# Patient Record
Sex: FEMALE | Race: ASIAN | NOT HISPANIC OR LATINO | URBAN - METROPOLITAN AREA
[De-identification: names, ages, dates, MRNs, and addresses within clinical notes are randomized per-mention and may not be internally consistent; named-entity substitution may affect disease eponyms.]

---

## 2017-02-18 ENCOUNTER — INPATIENT (INPATIENT)
Facility: HOSPITAL | Age: 82
LOS: 16 days | Discharge: NURSING, REHAB OR LONG-TERM CARE | DRG: 177 | End: 2017-03-07
Attending: INTERNAL MEDICINE | Admitting: INTERNAL MEDICINE
Payer: MEDICARE

## 2017-02-18 VITALS
OXYGEN SATURATION: 94 % | RESPIRATION RATE: 22 BRPM | HEART RATE: 89 BPM | SYSTOLIC BLOOD PRESSURE: 148 MMHG | WEIGHT: 119.93 LBS | DIASTOLIC BLOOD PRESSURE: 78 MMHG | TEMPERATURE: 99 F

## 2017-02-18 DIAGNOSIS — J44.9 CHRONIC OBSTRUCTIVE PULMONARY DISEASE, UNSPECIFIED: ICD-10-CM

## 2017-02-18 LAB
ALBUMIN SERPL ELPH-MCNC: 3.4 G/DL — LOW (ref 3.5–5)
ALP SERPL-CCNC: 102 U/L — SIGNIFICANT CHANGE UP (ref 40–120)
ALT FLD-CCNC: 27 U/L DA — SIGNIFICANT CHANGE UP (ref 10–60)
ANION GAP SERPL CALC-SCNC: 10 MMOL/L — SIGNIFICANT CHANGE UP (ref 5–17)
AST SERPL-CCNC: 20 U/L — SIGNIFICANT CHANGE UP (ref 10–40)
BASE EXCESS BLDA CALC-SCNC: 3.3 MMOL/L — HIGH (ref -2–2)
BASOPHILS # BLD AUTO: 0.1 K/UL — SIGNIFICANT CHANGE UP (ref 0–0.2)
BASOPHILS NFR BLD AUTO: 1.4 % — SIGNIFICANT CHANGE UP (ref 0–2)
BILIRUB SERPL-MCNC: 0.3 MG/DL — SIGNIFICANT CHANGE UP (ref 0.2–1.2)
BLOOD GAS COMMENTS ARTERIAL: SIGNIFICANT CHANGE UP
BUN SERPL-MCNC: 18 MG/DL — SIGNIFICANT CHANGE UP (ref 7–18)
CALCIUM SERPL-MCNC: 9.2 MG/DL — SIGNIFICANT CHANGE UP (ref 8.4–10.5)
CHLORIDE SERPL-SCNC: 97 MMOL/L — SIGNIFICANT CHANGE UP (ref 96–108)
CK SERPL-CCNC: 132 U/L — SIGNIFICANT CHANGE UP (ref 21–215)
CO2 SERPL-SCNC: 28 MMOL/L — SIGNIFICANT CHANGE UP (ref 22–31)
CREAT SERPL-MCNC: 0.97 MG/DL — SIGNIFICANT CHANGE UP (ref 0.5–1.3)
EOSINOPHIL # BLD AUTO: 0.5 K/UL — SIGNIFICANT CHANGE UP (ref 0–0.5)
EOSINOPHIL NFR BLD AUTO: 5.6 % — SIGNIFICANT CHANGE UP (ref 0–6)
GLUCOSE SERPL-MCNC: 149 MG/DL — HIGH (ref 70–99)
HCO3 BLDA-SCNC: 28 MMOL/L — HIGH (ref 23–27)
HCT VFR BLD CALC: 28.6 % — LOW (ref 34.5–45)
HGB BLD-MCNC: 9.3 G/DL — LOW (ref 11.5–15.5)
HOROWITZ INDEX BLDA+IHG-RTO: 28 — SIGNIFICANT CHANGE UP
LYMPHOCYTES # BLD AUTO: 2.4 K/UL — SIGNIFICANT CHANGE UP (ref 1–3.3)
LYMPHOCYTES # BLD AUTO: 27.8 % — SIGNIFICANT CHANGE UP (ref 13–44)
MAGNESIUM SERPL-MCNC: 2.1 MG/DL — SIGNIFICANT CHANGE UP (ref 1.8–2.4)
MCHC RBC-ENTMCNC: 27.2 PG — SIGNIFICANT CHANGE UP (ref 27–34)
MCHC RBC-ENTMCNC: 32.4 GM/DL — SIGNIFICANT CHANGE UP (ref 32–36)
MCV RBC AUTO: 83.7 FL — SIGNIFICANT CHANGE UP (ref 80–100)
MONOCYTES # BLD AUTO: 0.9 K/UL — SIGNIFICANT CHANGE UP (ref 0–0.9)
MONOCYTES NFR BLD AUTO: 10.4 % — SIGNIFICANT CHANGE UP (ref 2–14)
NEUTROPHILS # BLD AUTO: 4.7 K/UL — SIGNIFICANT CHANGE UP (ref 1.8–7.4)
NEUTROPHILS NFR BLD AUTO: 55 % — SIGNIFICANT CHANGE UP (ref 43–77)
OB PNL STL: NEGATIVE — SIGNIFICANT CHANGE UP
PCO2 BLDA: 42 MMHG — SIGNIFICANT CHANGE UP (ref 32–46)
PH BLDA: 7.43 — SIGNIFICANT CHANGE UP (ref 7.35–7.45)
PLATELET # BLD AUTO: 201 K/UL — SIGNIFICANT CHANGE UP (ref 150–400)
PO2 BLDA: 72 MMHG — LOW (ref 74–108)
POTASSIUM SERPL-MCNC: 4.6 MMOL/L — SIGNIFICANT CHANGE UP (ref 3.5–5.3)
POTASSIUM SERPL-SCNC: 4.6 MMOL/L — SIGNIFICANT CHANGE UP (ref 3.5–5.3)
PROT SERPL-MCNC: 7.9 G/DL — SIGNIFICANT CHANGE UP (ref 6–8.3)
RBC # BLD: 3.41 M/UL — LOW (ref 3.8–5.2)
RBC # FLD: 13.3 % — SIGNIFICANT CHANGE UP (ref 10.3–14.5)
SAO2 % BLDA: 95 % — SIGNIFICANT CHANGE UP (ref 92–96)
SODIUM SERPL-SCNC: 135 MMOL/L — SIGNIFICANT CHANGE UP (ref 135–145)
TROPONIN I SERPL-MCNC: <0.015 NG/ML — SIGNIFICANT CHANGE UP (ref 0–0.04)
WBC # BLD: 8.5 K/UL — SIGNIFICANT CHANGE UP (ref 3.8–10.5)
WBC # FLD AUTO: 8.5 K/UL — SIGNIFICANT CHANGE UP (ref 3.8–10.5)

## 2017-02-18 PROCEDURE — 71010: CPT | Mod: 26

## 2017-02-18 PROCEDURE — 99284 EMERGENCY DEPT VISIT MOD MDM: CPT | Mod: 25

## 2017-02-18 RX ORDER — ALBUTEROL 90 UG/1
2.5 AEROSOL, METERED ORAL ONCE
Qty: 0 | Refills: 0 | Status: COMPLETED | OUTPATIENT
Start: 2017-02-18 | End: 2017-02-18

## 2017-02-18 RX ADMIN — ALBUTEROL 2.5 MILLIGRAM(S): 90 AEROSOL, METERED ORAL at 22:41

## 2017-02-18 RX ADMIN — Medication 125 MILLIGRAM(S): at 22:41

## 2017-02-18 NOTE — ED ADULT NURSE NOTE - OBJECTIVE STATEMENT
Pt. bib EMS in respiratory distress. Pt. is awake but refuses to speak. medicated as per order. Pt. on cardiac monitor and connected to wall oxygen of 2 liters. Family at bedside.

## 2017-02-18 NOTE — ED PROVIDER NOTE - CARE PLAN
Principal Discharge DX:	Dyspnea, unspecified type Principal Discharge DX:	Chronic obstructive pulmonary disease, unspecified COPD type

## 2017-02-18 NOTE — ED PROVIDER NOTE - MEDICAL DECISION MAKING DETAILS
89 y/o f DNR, copd, htn, dementia w difficulty breathing. nebs, steroids, magnesium sulfate, labs, cxr. Likely for admission.

## 2017-02-18 NOTE — ED PROVIDER NOTE - OBJECTIVE STATEMENT
89 y/o f h/o copd, dementia, anxiety, htn, hld, presenting from NH for difficulty breathing. Pt unable to contribute to history secondary to dementia.

## 2017-02-18 NOTE — ED ADULT NURSE NOTE - PMH
Alzheimer's dementia    Chronic ischemic heart disease    COPD (chronic obstructive pulmonary disease)    GERD (gastroesophageal reflux disease)    HLD (hyperlipidemia)    HTN (hypertension)

## 2017-02-19 DIAGNOSIS — J96.01 ACUTE RESPIRATORY FAILURE WITH HYPOXIA: ICD-10-CM

## 2017-02-19 DIAGNOSIS — I50.9 HEART FAILURE, UNSPECIFIED: ICD-10-CM

## 2017-02-19 DIAGNOSIS — Z41.8 ENCOUNTER FOR OTHER PROCEDURES FOR PURPOSES OTHER THAN REMEDYING HEALTH STATE: ICD-10-CM

## 2017-02-19 DIAGNOSIS — D64.9 ANEMIA, UNSPECIFIED: ICD-10-CM

## 2017-02-19 DIAGNOSIS — I10 ESSENTIAL (PRIMARY) HYPERTENSION: ICD-10-CM

## 2017-02-19 LAB
ALBUMIN SERPL ELPH-MCNC: 3.4 G/DL — LOW (ref 3.5–5)
ALP SERPL-CCNC: 101 U/L — SIGNIFICANT CHANGE UP (ref 40–120)
ALT FLD-CCNC: 27 U/L DA — SIGNIFICANT CHANGE UP (ref 10–60)
ANION GAP SERPL CALC-SCNC: 9 MMOL/L — SIGNIFICANT CHANGE UP (ref 5–17)
APPEARANCE UR: CLEAR — SIGNIFICANT CHANGE UP
AST SERPL-CCNC: 22 U/L — SIGNIFICANT CHANGE UP (ref 10–40)
BASOPHILS # BLD AUTO: 0 K/UL — SIGNIFICANT CHANGE UP (ref 0–0.2)
BASOPHILS # BLD AUTO: 0 K/UL — SIGNIFICANT CHANGE UP (ref 0–0.2)
BASOPHILS # BLD AUTO: 0.1 K/UL — SIGNIFICANT CHANGE UP (ref 0–0.2)
BASOPHILS NFR BLD AUTO: 0.3 % — SIGNIFICANT CHANGE UP (ref 0–2)
BASOPHILS NFR BLD AUTO: 0.7 % — SIGNIFICANT CHANGE UP (ref 0–2)
BASOPHILS NFR BLD AUTO: 1.3 % — SIGNIFICANT CHANGE UP (ref 0–2)
BILIRUB SERPL-MCNC: 0.3 MG/DL — SIGNIFICANT CHANGE UP (ref 0.2–1.2)
BILIRUB UR-MCNC: NEGATIVE — SIGNIFICANT CHANGE UP
BUN SERPL-MCNC: 18 MG/DL — SIGNIFICANT CHANGE UP (ref 7–18)
CALCIUM SERPL-MCNC: 9.2 MG/DL — SIGNIFICANT CHANGE UP (ref 8.4–10.5)
CHLORIDE SERPL-SCNC: 97 MMOL/L — SIGNIFICANT CHANGE UP (ref 96–108)
CHOLEST SERPL-MCNC: 182 MG/DL — SIGNIFICANT CHANGE UP (ref 10–199)
CO2 SERPL-SCNC: 29 MMOL/L — SIGNIFICANT CHANGE UP (ref 22–31)
COLOR SPEC: YELLOW — SIGNIFICANT CHANGE UP
CREAT SERPL-MCNC: 1.08 MG/DL — SIGNIFICANT CHANGE UP (ref 0.5–1.3)
DIFF PNL FLD: ABNORMAL
EOSINOPHIL # BLD AUTO: 0 K/UL — SIGNIFICANT CHANGE UP (ref 0–0.5)
EOSINOPHIL # BLD AUTO: 0 K/UL — SIGNIFICANT CHANGE UP (ref 0–0.5)
EOSINOPHIL # BLD AUTO: 0.3 K/UL — SIGNIFICANT CHANGE UP (ref 0–0.5)
EOSINOPHIL NFR BLD AUTO: 0.1 % — SIGNIFICANT CHANGE UP (ref 0–6)
EOSINOPHIL NFR BLD AUTO: 0.2 % — SIGNIFICANT CHANGE UP (ref 0–6)
EOSINOPHIL NFR BLD AUTO: 2.9 % — SIGNIFICANT CHANGE UP (ref 0–6)
FERRITIN SERPL-MCNC: 74.2 NG/ML — SIGNIFICANT CHANGE UP (ref 15–150)
GLUCOSE SERPL-MCNC: 177 MG/DL — HIGH (ref 70–99)
GLUCOSE UR QL: NEGATIVE — SIGNIFICANT CHANGE UP
HBA1C BLD-MCNC: 6.7 % — HIGH (ref 4–5.6)
HCT VFR BLD CALC: 29.5 % — LOW (ref 34.5–45)
HCT VFR BLD CALC: 39.5 % — SIGNIFICANT CHANGE UP (ref 34.5–45)
HCT VFR BLD CALC: 42 % — SIGNIFICANT CHANGE UP (ref 34.5–45)
HDLC SERPL-MCNC: 74 MG/DL — SIGNIFICANT CHANGE UP (ref 40–125)
HGB BLD-MCNC: 12.8 G/DL — SIGNIFICANT CHANGE UP (ref 11.5–15.5)
HGB BLD-MCNC: 13.5 G/DL — SIGNIFICANT CHANGE UP (ref 11.5–15.5)
HGB BLD-MCNC: 9.6 G/DL — LOW (ref 11.5–15.5)
IRON SATN MFR SERPL: 19 UG/DL — LOW (ref 40–150)
IRON SATN MFR SERPL: 9 % — LOW (ref 15–50)
KETONES UR-MCNC: NEGATIVE — SIGNIFICANT CHANGE UP
LACTATE SERPL-SCNC: 1.8 MMOL/L — SIGNIFICANT CHANGE UP (ref 0.7–2)
LACTATE SERPL-SCNC: 3 MMOL/L — HIGH (ref 0.7–2)
LACTATE SERPL-SCNC: 3.1 MMOL/L — HIGH (ref 0.7–2)
LEUKOCYTE ESTERASE UR-ACNC: NEGATIVE — SIGNIFICANT CHANGE UP
LIPID PNL WITH DIRECT LDL SERPL: 81 MG/DL — SIGNIFICANT CHANGE UP
LYMPHOCYTES # BLD AUTO: 0.6 K/UL — LOW (ref 1–3.3)
LYMPHOCYTES # BLD AUTO: 0.7 K/UL — LOW (ref 1–3.3)
LYMPHOCYTES # BLD AUTO: 1.6 K/UL — SIGNIFICANT CHANGE UP (ref 1–3.3)
LYMPHOCYTES # BLD AUTO: 16.5 % — SIGNIFICANT CHANGE UP (ref 13–44)
LYMPHOCYTES # BLD AUTO: 8.5 % — LOW (ref 13–44)
LYMPHOCYTES # BLD AUTO: 9 % — LOW (ref 13–44)
MAGNESIUM SERPL-MCNC: 2 MG/DL — SIGNIFICANT CHANGE UP (ref 1.8–2.4)
MCHC RBC-ENTMCNC: 26.7 PG — LOW (ref 27–34)
MCHC RBC-ENTMCNC: 27 PG — SIGNIFICANT CHANGE UP (ref 27–34)
MCHC RBC-ENTMCNC: 27 PG — SIGNIFICANT CHANGE UP (ref 27–34)
MCHC RBC-ENTMCNC: 32.1 GM/DL — SIGNIFICANT CHANGE UP (ref 32–36)
MCHC RBC-ENTMCNC: 32.3 GM/DL — SIGNIFICANT CHANGE UP (ref 32–36)
MCHC RBC-ENTMCNC: 32.4 GM/DL — SIGNIFICANT CHANGE UP (ref 32–36)
MCV RBC AUTO: 83 FL — SIGNIFICANT CHANGE UP (ref 80–100)
MCV RBC AUTO: 83.1 FL — SIGNIFICANT CHANGE UP (ref 80–100)
MCV RBC AUTO: 83.4 FL — SIGNIFICANT CHANGE UP (ref 80–100)
MONOCYTES # BLD AUTO: 0.1 K/UL — SIGNIFICANT CHANGE UP (ref 0–0.9)
MONOCYTES # BLD AUTO: 0.1 K/UL — SIGNIFICANT CHANGE UP (ref 0–0.9)
MONOCYTES # BLD AUTO: 0.4 K/UL — SIGNIFICANT CHANGE UP (ref 0–0.9)
MONOCYTES NFR BLD AUTO: 0.9 % — LOW (ref 2–14)
MONOCYTES NFR BLD AUTO: 1.2 % — LOW (ref 2–14)
MONOCYTES NFR BLD AUTO: 4.2 % — SIGNIFICANT CHANGE UP (ref 2–14)
NEUTROPHILS # BLD AUTO: 6.4 K/UL — SIGNIFICANT CHANGE UP (ref 1.8–7.4)
NEUTROPHILS # BLD AUTO: 7.3 K/UL — SIGNIFICANT CHANGE UP (ref 1.8–7.4)
NEUTROPHILS # BLD AUTO: 7.4 K/UL — SIGNIFICANT CHANGE UP (ref 1.8–7.4)
NEUTROPHILS NFR BLD AUTO: 75.2 % — SIGNIFICANT CHANGE UP (ref 43–77)
NEUTROPHILS NFR BLD AUTO: 89.4 % — HIGH (ref 43–77)
NEUTROPHILS NFR BLD AUTO: 89.8 % — HIGH (ref 43–77)
NITRITE UR-MCNC: NEGATIVE — SIGNIFICANT CHANGE UP
PH UR: 6 — SIGNIFICANT CHANGE UP (ref 4.8–8)
PHOSPHATE SERPL-MCNC: 2.6 MG/DL — SIGNIFICANT CHANGE UP (ref 2.5–4.5)
PLATELET # BLD AUTO: 188 K/UL — SIGNIFICANT CHANGE UP (ref 150–400)
PLATELET # BLD AUTO: 244 K/UL — SIGNIFICANT CHANGE UP (ref 150–400)
PLATELET # BLD AUTO: 261 K/UL — SIGNIFICANT CHANGE UP (ref 150–400)
POTASSIUM SERPL-MCNC: 4.8 MMOL/L — SIGNIFICANT CHANGE UP (ref 3.5–5.3)
POTASSIUM SERPL-SCNC: 4.8 MMOL/L — SIGNIFICANT CHANGE UP (ref 3.5–5.3)
PROT SERPL-MCNC: 8.2 G/DL — SIGNIFICANT CHANGE UP (ref 6–8.3)
PROT UR-MCNC: 30 MG/DL
RAPID RVP RESULT: SIGNIFICANT CHANGE UP
RBC # BLD: 3.55 M/UL — LOW (ref 3.8–5.2)
RBC # BLD: 4.74 M/UL — SIGNIFICANT CHANGE UP (ref 3.8–5.2)
RBC # BLD: 5.05 M/UL — SIGNIFICANT CHANGE UP (ref 3.8–5.2)
RBC # FLD: 13.1 % — SIGNIFICANT CHANGE UP (ref 10.3–14.5)
RBC # FLD: 13.2 % — SIGNIFICANT CHANGE UP (ref 10.3–14.5)
RBC # FLD: 13.5 % — SIGNIFICANT CHANGE UP (ref 10.3–14.5)
SODIUM SERPL-SCNC: 135 MMOL/L — SIGNIFICANT CHANGE UP (ref 135–145)
SP GR SPEC: 1.01 — SIGNIFICANT CHANGE UP (ref 1.01–1.02)
TIBC SERPL-MCNC: 203 UG/DL — LOW (ref 250–450)
TOTAL CHOLESTEROL/HDL RATIO MEASUREMENT: 2.5 RATIO — LOW (ref 3.3–7.1)
TRIGL SERPL-MCNC: 133 MG/DL — SIGNIFICANT CHANGE UP (ref 10–149)
UIBC SERPL-MCNC: 184 UG/DL — SIGNIFICANT CHANGE UP (ref 110–370)
UROBILINOGEN FLD QL: NEGATIVE — SIGNIFICANT CHANGE UP
WBC # BLD: 7.1 K/UL — SIGNIFICANT CHANGE UP (ref 3.8–10.5)
WBC # BLD: 8.2 K/UL — SIGNIFICANT CHANGE UP (ref 3.8–10.5)
WBC # BLD: 9.8 K/UL — SIGNIFICANT CHANGE UP (ref 3.8–10.5)
WBC # FLD AUTO: 7.1 K/UL — SIGNIFICANT CHANGE UP (ref 3.8–10.5)
WBC # FLD AUTO: 8.2 K/UL — SIGNIFICANT CHANGE UP (ref 3.8–10.5)
WBC # FLD AUTO: 9.8 K/UL — SIGNIFICANT CHANGE UP (ref 3.8–10.5)

## 2017-02-19 RX ORDER — IPRATROPIUM/ALBUTEROL SULFATE 18-103MCG
3 AEROSOL WITH ADAPTER (GRAM) INHALATION EVERY 6 HOURS
Qty: 0 | Refills: 0 | Status: DISCONTINUED | OUTPATIENT
Start: 2017-02-19 | End: 2017-02-22

## 2017-02-19 RX ORDER — DOCUSATE SODIUM 100 MG
100 CAPSULE ORAL
Qty: 0 | Refills: 0 | Status: DISCONTINUED | OUTPATIENT
Start: 2017-02-19 | End: 2017-03-03

## 2017-02-19 RX ORDER — MEMANTINE HYDROCHLORIDE 10 MG/1
5 TABLET ORAL
Qty: 0 | Refills: 0 | Status: DISCONTINUED | OUTPATIENT
Start: 2017-02-19 | End: 2017-03-01

## 2017-02-19 RX ORDER — SODIUM CHLORIDE 9 MG/ML
500 INJECTION INTRAMUSCULAR; INTRAVENOUS; SUBCUTANEOUS ONCE
Qty: 0 | Refills: 0 | Status: COMPLETED | OUTPATIENT
Start: 2017-02-19 | End: 2017-02-19

## 2017-02-19 RX ORDER — MORPHINE SULFATE 50 MG/1
2 CAPSULE, EXTENDED RELEASE ORAL EVERY 6 HOURS
Qty: 0 | Refills: 0 | Status: DISCONTINUED | OUTPATIENT
Start: 2017-02-19 | End: 2017-02-26

## 2017-02-19 RX ORDER — ASPIRIN/CALCIUM CARB/MAGNESIUM 324 MG
81 TABLET ORAL DAILY
Qty: 0 | Refills: 0 | Status: DISCONTINUED | OUTPATIENT
Start: 2017-02-19 | End: 2017-03-03

## 2017-02-19 RX ORDER — SODIUM CHLORIDE 9 MG/ML
1000 INJECTION INTRAMUSCULAR; INTRAVENOUS; SUBCUTANEOUS
Qty: 0 | Refills: 0 | Status: DISCONTINUED | OUTPATIENT
Start: 2017-02-19 | End: 2017-02-20

## 2017-02-19 RX ORDER — DEXTROSE 50 % IN WATER 50 %
1 SYRINGE (ML) INTRAVENOUS ONCE
Qty: 0 | Refills: 0 | Status: DISCONTINUED | OUTPATIENT
Start: 2017-02-19 | End: 2017-03-03

## 2017-02-19 RX ORDER — INSULIN LISPRO 100/ML
VIAL (ML) SUBCUTANEOUS
Qty: 0 | Refills: 0 | Status: DISCONTINUED | OUTPATIENT
Start: 2017-02-19 | End: 2017-02-28

## 2017-02-19 RX ORDER — DEXTROSE 50 % IN WATER 50 %
25 SYRINGE (ML) INTRAVENOUS ONCE
Qty: 0 | Refills: 0 | Status: DISCONTINUED | OUTPATIENT
Start: 2017-02-19 | End: 2017-03-03

## 2017-02-19 RX ORDER — ENOXAPARIN SODIUM 100 MG/ML
40 INJECTION SUBCUTANEOUS DAILY
Qty: 0 | Refills: 0 | Status: DISCONTINUED | OUTPATIENT
Start: 2017-02-19 | End: 2017-02-22

## 2017-02-19 RX ORDER — HYDROCORTISONE 1 %
1 OINTMENT (GRAM) TOPICAL THREE TIMES A DAY
Qty: 0 | Refills: 0 | Status: DISCONTINUED | OUTPATIENT
Start: 2017-02-19 | End: 2017-03-07

## 2017-02-19 RX ORDER — AMLODIPINE BESYLATE 2.5 MG/1
5 TABLET ORAL DAILY
Qty: 0 | Refills: 0 | Status: DISCONTINUED | OUTPATIENT
Start: 2017-02-19 | End: 2017-03-01

## 2017-02-19 RX ORDER — GLUCAGON INJECTION, SOLUTION 0.5 MG/.1ML
1 INJECTION, SOLUTION SUBCUTANEOUS ONCE
Qty: 0 | Refills: 0 | Status: DISCONTINUED | OUTPATIENT
Start: 2017-02-19 | End: 2017-03-03

## 2017-02-19 RX ORDER — SODIUM CHLORIDE 9 MG/ML
1000 INJECTION, SOLUTION INTRAVENOUS
Qty: 0 | Refills: 0 | Status: DISCONTINUED | OUTPATIENT
Start: 2017-02-19 | End: 2017-03-03

## 2017-02-19 RX ORDER — BUDESONIDE AND FORMOTEROL FUMARATE DIHYDRATE 160; 4.5 UG/1; UG/1
2 AEROSOL RESPIRATORY (INHALATION)
Qty: 0 | Refills: 0 | Status: DISCONTINUED | OUTPATIENT
Start: 2017-02-19 | End: 2017-03-03

## 2017-02-19 RX ORDER — SIMVASTATIN 20 MG/1
40 TABLET, FILM COATED ORAL AT BEDTIME
Qty: 0 | Refills: 0 | Status: DISCONTINUED | OUTPATIENT
Start: 2017-02-19 | End: 2017-03-03

## 2017-02-19 RX ORDER — DEXTROSE 50 % IN WATER 50 %
12.5 SYRINGE (ML) INTRAVENOUS ONCE
Qty: 0 | Refills: 0 | Status: DISCONTINUED | OUTPATIENT
Start: 2017-02-19 | End: 2017-03-03

## 2017-02-19 RX ADMIN — MEMANTINE HYDROCHLORIDE 5 MILLIGRAM(S): 10 TABLET ORAL at 06:01

## 2017-02-19 RX ADMIN — Medication 3 MILLILITER(S): at 08:39

## 2017-02-19 RX ADMIN — SIMVASTATIN 40 MILLIGRAM(S): 20 TABLET, FILM COATED ORAL at 23:26

## 2017-02-19 RX ADMIN — Medication 60 MILLIGRAM(S): at 10:53

## 2017-02-19 RX ADMIN — Medication: at 20:00

## 2017-02-19 RX ADMIN — Medication 60 MILLIGRAM(S): at 23:41

## 2017-02-19 RX ADMIN — Medication 60 MILLIGRAM(S): at 18:58

## 2017-02-19 RX ADMIN — Medication 3 MILLILITER(S): at 03:07

## 2017-02-19 RX ADMIN — Medication 100 MILLIGRAM(S): at 06:00

## 2017-02-19 RX ADMIN — AMLODIPINE BESYLATE 5 MILLIGRAM(S): 2.5 TABLET ORAL at 06:00

## 2017-02-19 RX ADMIN — Medication 3 MILLILITER(S): at 16:16

## 2017-02-19 RX ADMIN — Medication 3 MILLILITER(S): at 21:57

## 2017-02-19 RX ADMIN — Medication 60 MILLIGRAM(S): at 06:00

## 2017-02-19 RX ADMIN — SODIUM CHLORIDE 1000 MILLILITER(S): 9 INJECTION INTRAMUSCULAR; INTRAVENOUS; SUBCUTANEOUS at 19:34

## 2017-02-19 RX ADMIN — SODIUM CHLORIDE 3000 MILLILITER(S): 9 INJECTION INTRAMUSCULAR; INTRAVENOUS; SUBCUTANEOUS at 03:55

## 2017-02-19 RX ADMIN — ENOXAPARIN SODIUM 40 MILLIGRAM(S): 100 INJECTION SUBCUTANEOUS at 10:53

## 2017-02-19 RX ADMIN — Medication 1 TABLET(S): at 06:01

## 2017-02-19 RX ADMIN — SODIUM CHLORIDE 50 MILLILITER(S): 9 INJECTION INTRAMUSCULAR; INTRAVENOUS; SUBCUTANEOUS at 16:16

## 2017-02-19 NOTE — H&P ADULT. - ASSESSMENT
89 F from [facility], bed bound, PMH of Anxiety, HLD, Alzheimers, HTN, Chronic Ischemic heart Disease chf Grade 4 DD , COPD on nasal cannula 2 lt PRN, GERD, SOB, Muscle weakness was sent to ED for SOB, and wheezing  since yesterday is being admittedf for acute resp failure with hypoxia requiring BIPAP.

## 2017-02-19 NOTE — H&P ADULT. - PROBLEM SELECTOR PLAN 1
Acute respiratory failure with hypoxia.  Plan: likely sec to COPD exacerbation secondary to ? Pneumonia.  cxr rll infiltrate although no wbc count or fever and cxr similar compared to prior 7.43/42/78 Was put on bipap with improvement in symptoms.c/w solumedrol 60 q6 severe ronchi and abdominal breathing improved on bipap  morphine for resp distress DNR F/U rvp panel  s/o 1 dose of levaquin will hold off on abx for now  elevated lactic acid 3.0 s/p 500 ml bolus very mild hydration repeat lactate  caution with fluids as garde 4 dd although no rales on exam Acute respiratory failure with hypoxia.  Plan: likely sec to COPD exacerbation secondary to ? Pneumonia.  cxr rll infiltrate although no wbc count or fever and cxr similar compared to prior 7.43/42/78 Was put on bipap with improvement in symptoms.c/w solumedrol 60 q6 severe ronchi and abdominal breathing improved on bipap  morphine for resp distress DNR F/U rvp panel  s/o 1 dose of levaquin will hold off on abx for now  elevated lactic acid 3.0 s/p 500 ml bolus very mild hydration repeat lactate  caution with fluids as garde 4 dd although no rales on exam  Dr Jurgen miller ,f/u b/l venous dopplers to r/o DVT

## 2017-02-19 NOTE — H&P ADULT. - PROBLEM SELECTOR PLAN 2
anemia:inital h/h h/h 9.3/28.6 was normal( 14 ) on prior admissions    guiac negative ua negative for blood repeat cbc showing hemoglobin of 12,8 anemia panel depicting anemia of chronic disease

## 2017-02-19 NOTE — ED ADULT NURSE REASSESSMENT NOTE - NS ED NURSE REASSESS COMMENT FT1
PT NOTED WITH STAGE 2 TO RT BUTTOCKS AND STAGE 1 TO LT BUTTOCKS. PT A&OX1-    CABRERA IN PLACE --IVF NS INFUSING AT PER MD ORDER . BIPAP IN PLACE WITH MD ORDERS FOR SETTINGS. PT ADMITTED TO 4 N

## 2017-02-19 NOTE — H&P ADULT. - HISTORY OF PRESENT ILLNESS
89 F from [facility], bed bound, PMH of Anxiety, HLD MDR ecoli , Alzheimers, HTN, Chronic Ischemic heart Disease chf Grade 4 DD , COPD on nasal cannula 2 lt PRN, GERD, SOB, Muscle weakness was sent to ED for SOB, and wheezing  since yesterday. Pt is poor historian but responding to questions, history obtained from daughter at bedside Nubia [phone]. as per dayghter patient was in her usual state og health yesterday when she started having difficulty breathing SOB with wheezing and non productive cough , patient had prior admission to [facility] in Sept 2016 was admitted for hypoxic resp failure secondary to copd excerbation with pna . requiring the need for intermitted BIPAP during that hospital stay discharged home on nasal cannua.no fever , chills , diarrhea , chest pain , orthopnea or pnd reported ,ROS is negative except above    In the ED patients VS  T 99.1 bp 148/78 HR 89 RESP rate of22 pulse ox of 94 on 2 liters , labs pertinent for anemia with h/h 9.3/28.6 was normal( 14 ) on prior admissions  was given one dose of duoneb and 1 full dose sulumedrol and admitted for copd exacerbation . when seen by MAR patient was found to be in resp distress abdominal breathing audible severe ronchi on with sthethocope rr of 26 abf done showed :7.43/42/78 Was put on bipap with improvement in symptoms.

## 2017-02-20 LAB
ANION GAP SERPL CALC-SCNC: 11 MMOL/L — SIGNIFICANT CHANGE UP (ref 5–17)
BASOPHILS # BLD AUTO: 0 K/UL — SIGNIFICANT CHANGE UP (ref 0–0.2)
BASOPHILS NFR BLD AUTO: 0.3 % — SIGNIFICANT CHANGE UP (ref 0–2)
BUN SERPL-MCNC: 22 MG/DL — HIGH (ref 7–18)
CALCIUM SERPL-MCNC: 9.3 MG/DL — SIGNIFICANT CHANGE UP (ref 8.4–10.5)
CHLORIDE SERPL-SCNC: 102 MMOL/L — SIGNIFICANT CHANGE UP (ref 96–108)
CO2 SERPL-SCNC: 24 MMOL/L — SIGNIFICANT CHANGE UP (ref 22–31)
CREAT SERPL-MCNC: 1.01 MG/DL — SIGNIFICANT CHANGE UP (ref 0.5–1.3)
EOSINOPHIL # BLD AUTO: 0 K/UL — SIGNIFICANT CHANGE UP (ref 0–0.5)
EOSINOPHIL NFR BLD AUTO: 0.1 % — SIGNIFICANT CHANGE UP (ref 0–6)
GLUCOSE SERPL-MCNC: 219 MG/DL — HIGH (ref 70–99)
HCT VFR BLD CALC: 37.5 % — SIGNIFICANT CHANGE UP (ref 34.5–45)
HGB BLD-MCNC: 12.6 G/DL — SIGNIFICANT CHANGE UP (ref 11.5–15.5)
LYMPHOCYTES # BLD AUTO: 1 K/UL — SIGNIFICANT CHANGE UP (ref 1–3.3)
LYMPHOCYTES # BLD AUTO: 8.9 % — LOW (ref 13–44)
MCHC RBC-ENTMCNC: 27.5 PG — SIGNIFICANT CHANGE UP (ref 27–34)
MCHC RBC-ENTMCNC: 33.5 GM/DL — SIGNIFICANT CHANGE UP (ref 32–36)
MCV RBC AUTO: 82 FL — SIGNIFICANT CHANGE UP (ref 80–100)
MONOCYTES # BLD AUTO: 0.6 K/UL — SIGNIFICANT CHANGE UP (ref 0–0.9)
MONOCYTES NFR BLD AUTO: 5.1 % — SIGNIFICANT CHANGE UP (ref 2–14)
NEUTROPHILS # BLD AUTO: 9.3 K/UL — HIGH (ref 1.8–7.4)
NEUTROPHILS NFR BLD AUTO: 85.6 % — HIGH (ref 43–77)
PLATELET # BLD AUTO: 277 K/UL — SIGNIFICANT CHANGE UP (ref 150–400)
POTASSIUM SERPL-MCNC: 3.7 MMOL/L — SIGNIFICANT CHANGE UP (ref 3.5–5.3)
POTASSIUM SERPL-SCNC: 3.7 MMOL/L — SIGNIFICANT CHANGE UP (ref 3.5–5.3)
RBC # BLD: 4.57 M/UL — SIGNIFICANT CHANGE UP (ref 3.8–5.2)
RBC # FLD: 13.3 % — SIGNIFICANT CHANGE UP (ref 10.3–14.5)
SODIUM SERPL-SCNC: 137 MMOL/L — SIGNIFICANT CHANGE UP (ref 135–145)
TRANSFERRIN SERPL-MCNC: 151 MG/DL — LOW (ref 200–360)
WBC # BLD: 10.8 K/UL — HIGH (ref 3.8–10.5)
WBC # FLD AUTO: 10.8 K/UL — HIGH (ref 3.8–10.5)

## 2017-02-20 RX ORDER — SODIUM CHLORIDE 9 MG/ML
1000 INJECTION INTRAMUSCULAR; INTRAVENOUS; SUBCUTANEOUS
Qty: 0 | Refills: 0 | Status: COMPLETED | OUTPATIENT
Start: 2017-02-20 | End: 2017-02-21

## 2017-02-20 RX ADMIN — Medication 3 MILLILITER(S): at 08:11

## 2017-02-20 RX ADMIN — Medication 60 MILLIGRAM(S): at 17:40

## 2017-02-20 RX ADMIN — Medication 3 MILLILITER(S): at 03:22

## 2017-02-20 RX ADMIN — ENOXAPARIN SODIUM 40 MILLIGRAM(S): 100 INJECTION SUBCUTANEOUS at 12:08

## 2017-02-20 RX ADMIN — SODIUM CHLORIDE 50 MILLILITER(S): 9 INJECTION INTRAMUSCULAR; INTRAVENOUS; SUBCUTANEOUS at 17:03

## 2017-02-20 RX ADMIN — MEMANTINE HYDROCHLORIDE 5 MILLIGRAM(S): 10 TABLET ORAL at 05:49

## 2017-02-20 RX ADMIN — Medication 100 MILLIGRAM(S): at 05:37

## 2017-02-20 RX ADMIN — Medication 3 MILLILITER(S): at 20:38

## 2017-02-20 RX ADMIN — Medication 1 APPLICATION(S): at 05:49

## 2017-02-20 RX ADMIN — Medication 1: at 17:00

## 2017-02-20 RX ADMIN — Medication 60 MILLIGRAM(S): at 05:38

## 2017-02-20 RX ADMIN — Medication 2: at 12:06

## 2017-02-20 RX ADMIN — Medication 1 APPLICATION(S): at 14:32

## 2017-02-20 RX ADMIN — Medication 3 MILLILITER(S): at 14:37

## 2017-02-20 RX ADMIN — Medication 2: at 07:48

## 2017-02-20 RX ADMIN — Medication 1 TABLET(S): at 05:37

## 2017-02-20 RX ADMIN — AMLODIPINE BESYLATE 5 MILLIGRAM(S): 2.5 TABLET ORAL at 05:37

## 2017-02-20 RX ADMIN — Medication 1 APPLICATION(S): at 21:46

## 2017-02-20 RX ADMIN — Medication 60 MILLIGRAM(S): at 12:08

## 2017-02-20 NOTE — DIETITIAN INITIAL EVALUATION ADULT. - OTHER INFO
Pt visited.D/W family at bedside.Reports Pt was eating Good In NH.No weight loss.Fed by staff.Pt was on Pureed w Honey Liquids.Sometimes choking .At presenr pt is NPO.ST  Noted noted  2/20

## 2017-02-21 LAB
ANION GAP SERPL CALC-SCNC: 9 MMOL/L — SIGNIFICANT CHANGE UP (ref 5–17)
BASE EXCESS BLDA CALC-SCNC: -0.4 MMOL/L — SIGNIFICANT CHANGE UP (ref -2–2)
BASOPHILS # BLD AUTO: 0 K/UL — SIGNIFICANT CHANGE UP (ref 0–0.2)
BASOPHILS NFR BLD AUTO: 0.2 % — SIGNIFICANT CHANGE UP (ref 0–2)
BUN SERPL-MCNC: 24 MG/DL — HIGH (ref 7–18)
CALCIUM SERPL-MCNC: 8.9 MG/DL — SIGNIFICANT CHANGE UP (ref 8.4–10.5)
CHLORIDE SERPL-SCNC: 108 MMOL/L — SIGNIFICANT CHANGE UP (ref 96–108)
CO2 SERPL-SCNC: 26 MMOL/L — SIGNIFICANT CHANGE UP (ref 22–31)
CREAT SERPL-MCNC: 0.9 MG/DL — SIGNIFICANT CHANGE UP (ref 0.5–1.3)
CRP SERPL-MCNC: 0.33 MG/DL — SIGNIFICANT CHANGE UP (ref 0–0.4)
D DIMER BLD IA.RAPID-MCNC: 406 NG/ML DDU — HIGH
EOSINOPHIL # BLD AUTO: 0 K/UL — SIGNIFICANT CHANGE UP (ref 0–0.5)
EOSINOPHIL NFR BLD AUTO: 0 % — SIGNIFICANT CHANGE UP (ref 0–6)
ERYTHROCYTE [SEDIMENTATION RATE] IN BLOOD: 36 MM/HR — HIGH (ref 0–20)
GLUCOSE SERPL-MCNC: 209 MG/DL — HIGH (ref 70–99)
HCO3 BLDA-SCNC: 24 MMOL/L — SIGNIFICANT CHANGE UP (ref 23–27)
HCT VFR BLD CALC: 37.2 % — SIGNIFICANT CHANGE UP (ref 34.5–45)
HGB BLD-MCNC: 12.2 G/DL — SIGNIFICANT CHANGE UP (ref 11.5–15.5)
HOROWITZ INDEX BLDA+IHG-RTO: 30 — SIGNIFICANT CHANGE UP
LYMPHOCYTES # BLD AUTO: 0.5 K/UL — LOW (ref 1–3.3)
LYMPHOCYTES # BLD AUTO: 5.6 % — LOW (ref 13–44)
MCHC RBC-ENTMCNC: 27.1 PG — SIGNIFICANT CHANGE UP (ref 27–34)
MCHC RBC-ENTMCNC: 32.8 GM/DL — SIGNIFICANT CHANGE UP (ref 32–36)
MCV RBC AUTO: 82.9 FL — SIGNIFICANT CHANGE UP (ref 80–100)
MONOCYTES # BLD AUTO: 0.5 K/UL — SIGNIFICANT CHANGE UP (ref 0–0.9)
MONOCYTES NFR BLD AUTO: 5.8 % — SIGNIFICANT CHANGE UP (ref 2–14)
NEUTROPHILS # BLD AUTO: 8.3 K/UL — HIGH (ref 1.8–7.4)
NEUTROPHILS NFR BLD AUTO: 88.4 % — HIGH (ref 43–77)
PCO2 BLDA: 39 MMHG — SIGNIFICANT CHANGE UP (ref 32–46)
PH BLDA: 7.4 — SIGNIFICANT CHANGE UP (ref 7.35–7.45)
PLATELET # BLD AUTO: 226 K/UL — SIGNIFICANT CHANGE UP (ref 150–400)
PO2 BLDA: 68 MMHG — LOW (ref 74–108)
POTASSIUM SERPL-MCNC: 3.6 MMOL/L — SIGNIFICANT CHANGE UP (ref 3.5–5.3)
POTASSIUM SERPL-SCNC: 3.6 MMOL/L — SIGNIFICANT CHANGE UP (ref 3.5–5.3)
RBC # BLD: 4.49 M/UL — SIGNIFICANT CHANGE UP (ref 3.8–5.2)
RBC # FLD: 13.6 % — SIGNIFICANT CHANGE UP (ref 10.3–14.5)
SAO2 % BLDA: 94 % — SIGNIFICANT CHANGE UP (ref 92–96)
SODIUM SERPL-SCNC: 143 MMOL/L — SIGNIFICANT CHANGE UP (ref 135–145)
WBC # BLD: 9.4 K/UL — SIGNIFICANT CHANGE UP (ref 3.8–10.5)
WBC # FLD AUTO: 9.4 K/UL — SIGNIFICANT CHANGE UP (ref 3.8–10.5)

## 2017-02-21 PROCEDURE — 93970 EXTREMITY STUDY: CPT | Mod: 26

## 2017-02-21 RX ORDER — FUROSEMIDE 40 MG
40 TABLET ORAL EVERY 12 HOURS
Qty: 0 | Refills: 0 | Status: DISCONTINUED | OUTPATIENT
Start: 2017-02-21 | End: 2017-02-23

## 2017-02-21 RX ORDER — METOPROLOL TARTRATE 50 MG
12.5 TABLET ORAL ONCE
Qty: 0 | Refills: 0 | Status: DISCONTINUED | OUTPATIENT
Start: 2017-02-21 | End: 2017-02-21

## 2017-02-21 RX ORDER — METOPROLOL TARTRATE 50 MG
2.5 TABLET ORAL ONCE
Qty: 0 | Refills: 0 | Status: COMPLETED | OUTPATIENT
Start: 2017-02-21 | End: 2017-02-21

## 2017-02-21 RX ADMIN — Medication 3 MILLILITER(S): at 14:11

## 2017-02-21 RX ADMIN — Medication 1 APPLICATION(S): at 15:29

## 2017-02-21 RX ADMIN — ENOXAPARIN SODIUM 40 MILLIGRAM(S): 100 INJECTION SUBCUTANEOUS at 12:15

## 2017-02-21 RX ADMIN — Medication 2.5 MILLIGRAM(S): at 21:52

## 2017-02-21 RX ADMIN — SODIUM CHLORIDE 50 MILLILITER(S): 9 INJECTION INTRAMUSCULAR; INTRAVENOUS; SUBCUTANEOUS at 05:19

## 2017-02-21 RX ADMIN — Medication 40 MILLIGRAM(S): at 17:37

## 2017-02-21 RX ADMIN — Medication 1: at 08:13

## 2017-02-21 RX ADMIN — Medication 1 APPLICATION(S): at 05:18

## 2017-02-21 RX ADMIN — Medication 1: at 16:49

## 2017-02-21 RX ADMIN — Medication 1: at 11:51

## 2017-02-21 RX ADMIN — Medication 3 MILLILITER(S): at 02:39

## 2017-02-21 RX ADMIN — Medication 1 APPLICATION(S): at 21:53

## 2017-02-21 RX ADMIN — Medication 60 MILLIGRAM(S): at 12:15

## 2017-02-21 RX ADMIN — Medication 3 MILLILITER(S): at 20:23

## 2017-02-21 RX ADMIN — Medication 3 MILLILITER(S): at 08:12

## 2017-02-22 LAB
ANION GAP SERPL CALC-SCNC: 11 MMOL/L — SIGNIFICANT CHANGE UP (ref 5–17)
BASE EXCESS BLDA CALC-SCNC: 8.7 MMOL/L — HIGH (ref -2–2)
BLOOD GAS COMMENTS ARTERIAL: SIGNIFICANT CHANGE UP
BUN SERPL-MCNC: 28 MG/DL — HIGH (ref 7–18)
CALCIUM SERPL-MCNC: 9 MG/DL — SIGNIFICANT CHANGE UP (ref 8.4–10.5)
CHLORIDE SERPL-SCNC: 102 MMOL/L — SIGNIFICANT CHANGE UP (ref 96–108)
CO2 SERPL-SCNC: 31 MMOL/L — SIGNIFICANT CHANGE UP (ref 22–31)
CREAT SERPL-MCNC: 0.98 MG/DL — SIGNIFICANT CHANGE UP (ref 0.5–1.3)
GLUCOSE SERPL-MCNC: 203 MG/DL — HIGH (ref 70–99)
HCO3 BLDA-SCNC: 32 MMOL/L — HIGH (ref 23–27)
HCT VFR BLD CALC: 44.5 % — SIGNIFICANT CHANGE UP (ref 34.5–45)
HGB BLD-MCNC: 14 G/DL — SIGNIFICANT CHANGE UP (ref 11.5–15.5)
HOROWITZ INDEX BLDA+IHG-RTO: 40 — SIGNIFICANT CHANGE UP
MCHC RBC-ENTMCNC: 26.4 PG — LOW (ref 27–34)
MCHC RBC-ENTMCNC: 31.3 GM/DL — LOW (ref 32–36)
MCV RBC AUTO: 84.1 FL — SIGNIFICANT CHANGE UP (ref 80–100)
PCO2 BLDA: 41 MMHG — SIGNIFICANT CHANGE UP (ref 32–46)
PH BLDA: 7.51 — HIGH (ref 7.35–7.45)
PLATELET # BLD AUTO: 278 K/UL — SIGNIFICANT CHANGE UP (ref 150–400)
PO2 BLDA: 140 MMHG — HIGH (ref 74–108)
POTASSIUM SERPL-MCNC: 3 MMOL/L — LOW (ref 3.5–5.3)
POTASSIUM SERPL-SCNC: 3 MMOL/L — LOW (ref 3.5–5.3)
RBC # BLD: 5.29 M/UL — HIGH (ref 3.8–5.2)
RBC # FLD: 13.8 % — SIGNIFICANT CHANGE UP (ref 10.3–14.5)
SAO2 % BLDA: 100 % — HIGH (ref 92–96)
SODIUM SERPL-SCNC: 144 MMOL/L — SIGNIFICANT CHANGE UP (ref 135–145)
WBC # BLD: 8.5 K/UL — SIGNIFICANT CHANGE UP (ref 3.8–10.5)
WBC # FLD AUTO: 8.5 K/UL — SIGNIFICANT CHANGE UP (ref 3.8–10.5)

## 2017-02-22 PROCEDURE — 71275 CT ANGIOGRAPHY CHEST: CPT | Mod: 26

## 2017-02-22 PROCEDURE — 71010: CPT | Mod: 26

## 2017-02-22 RX ORDER — ENOXAPARIN SODIUM 100 MG/ML
70 INJECTION SUBCUTANEOUS EVERY 12 HOURS
Qty: 0 | Refills: 0 | Status: DISCONTINUED | OUTPATIENT
Start: 2017-02-22 | End: 2017-02-23

## 2017-02-22 RX ORDER — POTASSIUM CHLORIDE 20 MEQ
10 PACKET (EA) ORAL
Qty: 0 | Refills: 0 | Status: COMPLETED | OUTPATIENT
Start: 2017-02-22 | End: 2017-02-22

## 2017-02-22 RX ORDER — METRONIDAZOLE 500 MG
500 TABLET ORAL EVERY 8 HOURS
Qty: 0 | Refills: 0 | Status: DISCONTINUED | OUTPATIENT
Start: 2017-02-22 | End: 2017-02-26

## 2017-02-22 RX ORDER — ENOXAPARIN SODIUM 100 MG/ML
40 INJECTION SUBCUTANEOUS
Qty: 0 | Refills: 0 | Status: DISCONTINUED | OUTPATIENT
Start: 2017-02-22 | End: 2017-02-22

## 2017-02-22 RX ORDER — POTASSIUM CHLORIDE 20 MEQ
10 PACKET (EA) ORAL
Qty: 0 | Refills: 0 | Status: DISCONTINUED | OUTPATIENT
Start: 2017-02-22 | End: 2017-02-22

## 2017-02-22 RX ORDER — METRONIDAZOLE 500 MG
500 TABLET ORAL ONCE
Qty: 0 | Refills: 0 | Status: COMPLETED | OUTPATIENT
Start: 2017-02-22 | End: 2017-02-22

## 2017-02-22 RX ORDER — IPRATROPIUM BROMIDE 0.2 MG/ML
500 SOLUTION, NON-ORAL INHALATION EVERY 6 HOURS
Qty: 0 | Refills: 0 | Status: DISCONTINUED | OUTPATIENT
Start: 2017-02-22 | End: 2017-02-25

## 2017-02-22 RX ORDER — ACETAMINOPHEN 500 MG
650 TABLET ORAL
Qty: 0 | Refills: 0 | Status: DISCONTINUED | OUTPATIENT
Start: 2017-02-22 | End: 2017-03-07

## 2017-02-22 RX ORDER — MORPHINE SULFATE 50 MG/1
1 CAPSULE, EXTENDED RELEASE ORAL ONCE
Qty: 0 | Refills: 0 | Status: DISCONTINUED | OUTPATIENT
Start: 2017-02-22 | End: 2017-02-22

## 2017-02-22 RX ORDER — POTASSIUM CHLORIDE 20 MEQ
20 PACKET (EA) ORAL ONCE
Qty: 0 | Refills: 0 | Status: COMPLETED | OUTPATIENT
Start: 2017-02-22 | End: 2017-02-22

## 2017-02-22 RX ORDER — POTASSIUM CHLORIDE 20 MEQ
10 PACKET (EA) ORAL ONCE
Qty: 0 | Refills: 0 | Status: DISCONTINUED | OUTPATIENT
Start: 2017-02-22 | End: 2017-02-22

## 2017-02-22 RX ORDER — METRONIDAZOLE 500 MG
TABLET ORAL
Qty: 0 | Refills: 0 | Status: DISCONTINUED | OUTPATIENT
Start: 2017-02-22 | End: 2017-02-26

## 2017-02-22 RX ORDER — METOPROLOL TARTRATE 50 MG
2.5 TABLET ORAL ONCE
Qty: 0 | Refills: 0 | Status: COMPLETED | OUTPATIENT
Start: 2017-02-22 | End: 2017-02-22

## 2017-02-22 RX ADMIN — Medication 500 MICROGRAM(S): at 20:12

## 2017-02-22 RX ADMIN — Medication 40 MILLIGRAM(S): at 18:04

## 2017-02-22 RX ADMIN — Medication 500 MICROGRAM(S): at 16:16

## 2017-02-22 RX ADMIN — Medication 100 MILLIEQUIVALENT(S): at 15:46

## 2017-02-22 RX ADMIN — Medication 40 MILLIGRAM(S): at 11:48

## 2017-02-22 RX ADMIN — Medication 1 APPLICATION(S): at 22:29

## 2017-02-22 RX ADMIN — Medication 100 MILLIGRAM(S): at 22:29

## 2017-02-22 RX ADMIN — Medication 100 MILLIEQUIVALENT(S): at 12:07

## 2017-02-22 RX ADMIN — Medication 1: at 12:07

## 2017-02-22 RX ADMIN — Medication 100 MILLIGRAM(S): at 17:24

## 2017-02-22 RX ADMIN — MORPHINE SULFATE 1 MILLIGRAM(S): 50 CAPSULE, EXTENDED RELEASE ORAL at 17:13

## 2017-02-22 RX ADMIN — Medication 100 MILLIEQUIVALENT(S): at 13:36

## 2017-02-22 RX ADMIN — AMLODIPINE BESYLATE 5 MILLIGRAM(S): 2.5 TABLET ORAL at 14:09

## 2017-02-22 RX ADMIN — ENOXAPARIN SODIUM 70 MILLIGRAM(S): 100 INJECTION SUBCUTANEOUS at 18:04

## 2017-02-22 RX ADMIN — Medication 40 MILLIGRAM(S): at 01:24

## 2017-02-22 RX ADMIN — Medication 650 MILLIGRAM(S): at 17:13

## 2017-02-22 RX ADMIN — ENOXAPARIN SODIUM 40 MILLIGRAM(S): 100 INJECTION SUBCUTANEOUS at 11:48

## 2017-02-22 RX ADMIN — MORPHINE SULFATE 1 MILLIGRAM(S): 50 CAPSULE, EXTENDED RELEASE ORAL at 17:30

## 2017-02-22 RX ADMIN — Medication 1 APPLICATION(S): at 05:42

## 2017-02-22 RX ADMIN — Medication 81 MILLIGRAM(S): at 11:48

## 2017-02-22 RX ADMIN — Medication 40 MILLIGRAM(S): at 04:14

## 2017-02-22 RX ADMIN — Medication 40 MILLIGRAM(S): at 18:05

## 2017-02-22 RX ADMIN — Medication 40 MILLIGRAM(S): at 23:12

## 2017-02-22 RX ADMIN — Medication 1 APPLICATION(S): at 14:10

## 2017-02-22 RX ADMIN — Medication 40 MILLIGRAM(S): at 05:42

## 2017-02-22 RX ADMIN — Medication 3 MILLILITER(S): at 09:03

## 2017-02-23 LAB
ANION GAP SERPL CALC-SCNC: 11 MMOL/L — SIGNIFICANT CHANGE UP (ref 5–17)
BASE EXCESS BLDA CALC-SCNC: 9 MMOL/L — HIGH (ref -2–2)
BUN SERPL-MCNC: 36 MG/DL — HIGH (ref 7–18)
CALCIUM SERPL-MCNC: 9.1 MG/DL — SIGNIFICANT CHANGE UP (ref 8.4–10.5)
CHLORIDE SERPL-SCNC: 101 MMOL/L — SIGNIFICANT CHANGE UP (ref 96–108)
CO2 SERPL-SCNC: 32 MMOL/L — HIGH (ref 22–31)
CREAT SERPL-MCNC: 1.23 MG/DL — SIGNIFICANT CHANGE UP (ref 0.5–1.3)
GLUCOSE SERPL-MCNC: 242 MG/DL — HIGH (ref 70–99)
HCO3 BLDA-SCNC: 33 MMOL/L — HIGH (ref 23–27)
HCT VFR BLD CALC: 47 % — HIGH (ref 34.5–45)
HGB BLD-MCNC: 14.8 G/DL — SIGNIFICANT CHANGE UP (ref 11.5–15.5)
HOROWITZ INDEX BLDA+IHG-RTO: SIGNIFICANT CHANGE UP
MAGNESIUM SERPL-MCNC: 2.4 MG/DL — SIGNIFICANT CHANGE UP (ref 1.8–2.4)
MCHC RBC-ENTMCNC: 26.4 PG — LOW (ref 27–34)
MCHC RBC-ENTMCNC: 31.5 GM/DL — LOW (ref 32–36)
MCV RBC AUTO: 83.8 FL — SIGNIFICANT CHANGE UP (ref 80–100)
PCO2 BLDA: 40 MMHG — SIGNIFICANT CHANGE UP (ref 32–46)
PH BLDA: 7.52 — HIGH (ref 7.35–7.45)
PLATELET # BLD AUTO: 280 K/UL — SIGNIFICANT CHANGE UP (ref 150–400)
PO2 BLDA: 74 MMHG — SIGNIFICANT CHANGE UP (ref 74–108)
POTASSIUM SERPL-MCNC: 2.9 MMOL/L — CRITICAL LOW (ref 3.5–5.3)
POTASSIUM SERPL-SCNC: 2.9 MMOL/L — CRITICAL LOW (ref 3.5–5.3)
RBC # BLD: 5.61 M/UL — HIGH (ref 3.8–5.2)
RBC # FLD: 13.3 % — SIGNIFICANT CHANGE UP (ref 10.3–14.5)
SAO2 % BLDA: 96 % — SIGNIFICANT CHANGE UP (ref 92–96)
SODIUM SERPL-SCNC: 144 MMOL/L — SIGNIFICANT CHANGE UP (ref 135–145)
WBC # BLD: 7 K/UL — SIGNIFICANT CHANGE UP (ref 3.8–10.5)
WBC # FLD AUTO: 7 K/UL — SIGNIFICANT CHANGE UP (ref 3.8–10.5)

## 2017-02-23 RX ORDER — ENOXAPARIN SODIUM 100 MG/ML
40 INJECTION SUBCUTANEOUS DAILY
Qty: 0 | Refills: 0 | Status: DISCONTINUED | OUTPATIENT
Start: 2017-02-23 | End: 2017-03-07

## 2017-02-23 RX ORDER — POTASSIUM CHLORIDE 20 MEQ
10 PACKET (EA) ORAL
Qty: 0 | Refills: 0 | Status: COMPLETED | OUTPATIENT
Start: 2017-02-23 | End: 2017-02-23

## 2017-02-23 RX ORDER — POTASSIUM CHLORIDE 20 MEQ
20 PACKET (EA) ORAL
Qty: 0 | Refills: 0 | Status: COMPLETED | OUTPATIENT
Start: 2017-02-23 | End: 2017-02-23

## 2017-02-23 RX ORDER — METOPROLOL TARTRATE 50 MG
2.5 TABLET ORAL ONCE
Qty: 0 | Refills: 0 | Status: COMPLETED | OUTPATIENT
Start: 2017-02-23 | End: 2017-02-23

## 2017-02-23 RX ORDER — METOPROLOL TARTRATE 50 MG
12.5 TABLET ORAL ONCE
Qty: 0 | Refills: 0 | Status: DISCONTINUED | OUTPATIENT
Start: 2017-02-23 | End: 2017-02-23

## 2017-02-23 RX ORDER — METOPROLOL TARTRATE 50 MG
12.5 TABLET ORAL
Qty: 0 | Refills: 0 | Status: DISCONTINUED | OUTPATIENT
Start: 2017-02-23 | End: 2017-02-25

## 2017-02-23 RX ORDER — ALPRAZOLAM 0.25 MG
0.25 TABLET ORAL
Qty: 0 | Refills: 0 | Status: DISCONTINUED | OUTPATIENT
Start: 2017-02-23 | End: 2017-02-28

## 2017-02-23 RX ORDER — METOPROLOL TARTRATE 50 MG
12.5 TABLET ORAL ONCE
Qty: 0 | Refills: 0 | Status: COMPLETED | OUTPATIENT
Start: 2017-02-23 | End: 2017-02-23

## 2017-02-23 RX ADMIN — Medication 100 MILLIGRAM(S): at 22:05

## 2017-02-23 RX ADMIN — Medication 20 MILLIEQUIVALENT(S): at 17:29

## 2017-02-23 RX ADMIN — ENOXAPARIN SODIUM 70 MILLIGRAM(S): 100 INJECTION SUBCUTANEOUS at 05:18

## 2017-02-23 RX ADMIN — Medication 1 TABLET(S): at 17:18

## 2017-02-23 RX ADMIN — Medication 100 MILLIEQUIVALENT(S): at 11:07

## 2017-02-23 RX ADMIN — Medication 12.5 MILLIGRAM(S): at 17:19

## 2017-02-23 RX ADMIN — Medication 2: at 11:41

## 2017-02-23 RX ADMIN — Medication 500 MICROGRAM(S): at 15:21

## 2017-02-23 RX ADMIN — ENOXAPARIN SODIUM 40 MILLIGRAM(S): 100 INJECTION SUBCUTANEOUS at 11:41

## 2017-02-23 RX ADMIN — Medication 81 MILLIGRAM(S): at 11:41

## 2017-02-23 RX ADMIN — Medication 500 MICROGRAM(S): at 21:25

## 2017-02-23 RX ADMIN — Medication 40 MILLIGRAM(S): at 11:41

## 2017-02-23 RX ADMIN — Medication 1 APPLICATION(S): at 13:07

## 2017-02-23 RX ADMIN — Medication 100 MILLIEQUIVALENT(S): at 09:35

## 2017-02-23 RX ADMIN — Medication 40 MILLIGRAM(S): at 17:19

## 2017-02-23 RX ADMIN — Medication 4: at 16:57

## 2017-02-23 RX ADMIN — Medication 100 MILLIGRAM(S): at 17:19

## 2017-02-23 RX ADMIN — Medication 40 MILLIGRAM(S): at 05:18

## 2017-02-23 RX ADMIN — Medication 650 MILLIGRAM(S): at 19:36

## 2017-02-23 RX ADMIN — MEMANTINE HYDROCHLORIDE 5 MILLIGRAM(S): 10 TABLET ORAL at 17:19

## 2017-02-23 RX ADMIN — AMLODIPINE BESYLATE 5 MILLIGRAM(S): 2.5 TABLET ORAL at 11:46

## 2017-02-23 RX ADMIN — Medication 2.5 MILLIGRAM(S): at 01:56

## 2017-02-23 RX ADMIN — Medication 1 APPLICATION(S): at 22:12

## 2017-02-23 RX ADMIN — Medication 0.25 MILLIGRAM(S): at 17:19

## 2017-02-23 RX ADMIN — Medication 12.5 MILLIGRAM(S): at 13:49

## 2017-02-23 RX ADMIN — Medication 100 MILLIEQUIVALENT(S): at 07:56

## 2017-02-23 RX ADMIN — Medication 100 MILLIGRAM(S): at 13:06

## 2017-02-23 RX ADMIN — Medication 100 MILLIGRAM(S): at 05:17

## 2017-02-23 RX ADMIN — Medication 1 APPLICATION(S): at 05:19

## 2017-02-23 RX ADMIN — Medication 500 MICROGRAM(S): at 09:06

## 2017-02-23 NOTE — SWALLOW BEDSIDE ASSESSMENT ADULT - SWALLOW EVAL: RECOMMENDED FEEDING/EATING TECHNIQUES
allow for swallow between intakes/hard swallow w/ each bite or sip/maintain upright posture during/after eating for 30 mins/alternate food with liquid/position upright (90 degrees)/check mouth frequently for oral residue/pocketing/small sips/bites

## 2017-02-23 NOTE — SWALLOW BEDSIDE ASSESSMENT ADULT - SLP PERTINENT HISTORY OF CURRENT PROBLEM
89 F from [facility], bed bound, PMH of Anxiety, HLD MDR ecoli , Alzheimers, HTN, Chronic Ischemic heart Disease chf Grade 4 DD , COPD on nasal cannula 2 lt PRN, GERD, SOB, Muscle weakness was sent to ED for SOB, and wheezing, and non-productive cough. Pt previously admitted 9/2016 for hypoxic resp. failure secondary to COPD exacerbation with PNA .

## 2017-02-23 NOTE — GOALS OF CARE CONVERSATION - PERSONAL ADVANCE DIRECTIVE - FUTURE HOSPITALIZATION/TRANSFER
Send to hospital, if necessary, based on [facility] orders Do not send to hospital unless pain or severe symptoms cannot be otherwise controlled

## 2017-02-23 NOTE — GOALS OF CARE CONVERSATION - PERSONAL ADVANCE DIRECTIVE - TREATMENT GUIDELINE COMMENT
DNR/DNI - a trial period of artificial nutrition; Bipap if necessary DNR/DNI - a trial period of artificial nutrition; Bipap if necessary; Family finally decided that they do not want peg. patient will be discharged to residential hospice at [place].

## 2017-02-23 NOTE — SWALLOW BEDSIDE ASSESSMENT ADULT - ORAL PHASE
Decreased anterior-posterior movement of the bolus/Delayed oral transit time Lingual stasis/Decreased anterior-posterior movement of the bolus/Delayed oral transit time/Stasis in anterior sulcus Delayed oral transit time/Decreased anterior-posterior movement of the bolus

## 2017-02-23 NOTE — SWALLOW BEDSIDE ASSESSMENT ADULT - SWALLOW EVAL: DIAGNOSIS
Pt p/w with s&s of oropharyngeal dysphagia c/b poor bolus formation, decreased mastication ability, oral stasis, decreased AP transport, delayed oral transit time, delayed swallow trigger, and s&s of penetration/aspiration (delayed cough) seen post Honey Thick Liquid PO trials.

## 2017-02-23 NOTE — SWALLOW BEDSIDE ASSESSMENT ADULT - COMMENTS
Pt received in bed, HOB elevated to 45 degrees. Pt currently on high flow nasal cannula, FiO2: 50. Pt is nonverbal at bedside and seemingly AAOx0. Mandibular and lingual fasciculations present at rest and during PO trials. Some pocketing of the bolus in the anterior sulcus, the majority effectively cleared by liquid wash No cough or s&s of penetration/aspiration seen; swallow trigger more timely than for honey thick liquids

## 2017-02-23 NOTE — GOALS OF CARE CONVERSATION - PERSONAL ADVANCE DIRECTIVE - CONVERSATION DETAILS
Daughters states that patient was very clear about DNR/DNI status however they are not sure about artificial nutrition

## 2017-02-23 NOTE — SWALLOW BEDSIDE ASSESSMENT ADULT - ORAL PREPARATORY PHASE
Reduced oral grading Decreased mastication ability/Reduced oral grading/Anterior loss of bolus Within functional limits

## 2017-02-23 NOTE — GOALS OF CARE CONVERSATION - PERSONAL ADVANCE DIRECTIVE - WHAT MATTERS MOST
Daughters state they want their mother to be comfortable; they state that patient is presently on a palliative unit at NH ([place]).

## 2017-02-24 LAB
ANION GAP SERPL CALC-SCNC: 7 MMOL/L — SIGNIFICANT CHANGE UP (ref 5–17)
BUN SERPL-MCNC: 46 MG/DL — HIGH (ref 7–18)
CALCIUM SERPL-MCNC: 8.9 MG/DL — SIGNIFICANT CHANGE UP (ref 8.4–10.5)
CHLORIDE SERPL-SCNC: 107 MMOL/L — SIGNIFICANT CHANGE UP (ref 96–108)
CO2 SERPL-SCNC: 34 MMOL/L — HIGH (ref 22–31)
CREAT SERPL-MCNC: 1.22 MG/DL — SIGNIFICANT CHANGE UP (ref 0.5–1.3)
GLUCOSE SERPL-MCNC: 280 MG/DL — HIGH (ref 70–99)
HCT VFR BLD CALC: 50.1 % — HIGH (ref 34.5–45)
HGB BLD-MCNC: 15.6 G/DL — HIGH (ref 11.5–15.5)
MCHC RBC-ENTMCNC: 26.3 PG — LOW (ref 27–34)
MCHC RBC-ENTMCNC: 31.2 GM/DL — LOW (ref 32–36)
MCV RBC AUTO: 84.2 FL — SIGNIFICANT CHANGE UP (ref 80–100)
PLATELET # BLD AUTO: 280 K/UL — SIGNIFICANT CHANGE UP (ref 150–400)
POTASSIUM SERPL-MCNC: 3.4 MMOL/L — LOW (ref 3.5–5.3)
POTASSIUM SERPL-SCNC: 3.4 MMOL/L — LOW (ref 3.5–5.3)
RBC # BLD: 5.95 M/UL — HIGH (ref 3.8–5.2)
RBC # FLD: 13.9 % — SIGNIFICANT CHANGE UP (ref 10.3–14.5)
SODIUM SERPL-SCNC: 148 MMOL/L — HIGH (ref 135–145)
WBC # BLD: 8.7 K/UL — SIGNIFICANT CHANGE UP (ref 3.8–10.5)
WBC # FLD AUTO: 8.7 K/UL — SIGNIFICANT CHANGE UP (ref 3.8–10.5)

## 2017-02-24 RX ORDER — METOPROLOL TARTRATE 50 MG
25 TABLET ORAL ONCE
Qty: 0 | Refills: 0 | Status: COMPLETED | OUTPATIENT
Start: 2017-02-24 | End: 2017-02-24

## 2017-02-24 RX ORDER — POTASSIUM CHLORIDE 20 MEQ
10 PACKET (EA) ORAL
Qty: 0 | Refills: 0 | Status: COMPLETED | OUTPATIENT
Start: 2017-02-24 | End: 2017-02-24

## 2017-02-24 RX ORDER — POTASSIUM CHLORIDE 20 MEQ
10 PACKET (EA) ORAL ONCE
Qty: 0 | Refills: 0 | Status: COMPLETED | OUTPATIENT
Start: 2017-02-24 | End: 2017-02-24

## 2017-02-24 RX ORDER — POTASSIUM CHLORIDE 20 MEQ
40 PACKET (EA) ORAL ONCE
Qty: 0 | Refills: 0 | Status: DISCONTINUED | OUTPATIENT
Start: 2017-02-24 | End: 2017-02-24

## 2017-02-24 RX ADMIN — Medication 40 MILLIGRAM(S): at 05:11

## 2017-02-24 RX ADMIN — Medication 1 APPLICATION(S): at 05:10

## 2017-02-24 RX ADMIN — Medication 4: at 17:29

## 2017-02-24 RX ADMIN — Medication 1.25 MILLIGRAM(S): at 18:45

## 2017-02-24 RX ADMIN — Medication 1 APPLICATION(S): at 14:29

## 2017-02-24 RX ADMIN — Medication 3: at 11:53

## 2017-02-24 RX ADMIN — Medication 40 MILLIGRAM(S): at 17:29

## 2017-02-24 RX ADMIN — Medication 1 APPLICATION(S): at 21:46

## 2017-02-24 RX ADMIN — Medication 100 MILLIGRAM(S): at 14:28

## 2017-02-24 RX ADMIN — Medication 500 MICROGRAM(S): at 20:47

## 2017-02-24 RX ADMIN — Medication 100 MILLIGRAM(S): at 05:08

## 2017-02-24 RX ADMIN — Medication 500 MICROGRAM(S): at 02:59

## 2017-02-24 RX ADMIN — Medication 100 MILLIEQUIVALENT(S): at 00:33

## 2017-02-24 RX ADMIN — Medication 100 MILLIEQUIVALENT(S): at 01:44

## 2017-02-24 RX ADMIN — Medication 100 MILLIGRAM(S): at 21:45

## 2017-02-24 RX ADMIN — Medication 40 MILLIGRAM(S): at 11:56

## 2017-02-24 RX ADMIN — Medication 2: at 08:45

## 2017-02-24 RX ADMIN — Medication 25 MILLIGRAM(S): at 20:09

## 2017-02-24 RX ADMIN — SIMVASTATIN 40 MILLIGRAM(S): 20 TABLET, FILM COATED ORAL at 21:45

## 2017-02-24 RX ADMIN — MORPHINE SULFATE 2 MILLIGRAM(S): 50 CAPSULE, EXTENDED RELEASE ORAL at 19:59

## 2017-02-24 RX ADMIN — Medication 40 MILLIGRAM(S): at 00:08

## 2017-02-24 RX ADMIN — ENOXAPARIN SODIUM 40 MILLIGRAM(S): 100 INJECTION SUBCUTANEOUS at 11:55

## 2017-02-24 RX ADMIN — Medication 500 MICROGRAM(S): at 15:20

## 2017-02-24 RX ADMIN — Medication 500 MICROGRAM(S): at 09:20

## 2017-02-24 RX ADMIN — Medication 100 MILLIEQUIVALENT(S): at 02:53

## 2017-02-24 RX ADMIN — Medication 50 MILLIEQUIVALENT(S): at 18:45

## 2017-02-24 RX ADMIN — Medication 81 MILLIGRAM(S): at 11:55

## 2017-02-25 LAB
ANION GAP SERPL CALC-SCNC: 12 MMOL/L — SIGNIFICANT CHANGE UP (ref 5–17)
BASE EXCESS BLDA CALC-SCNC: 7.3 MMOL/L — HIGH (ref -2–2)
BLOOD GAS COMMENTS ARTERIAL: SIGNIFICANT CHANGE UP
BUN SERPL-MCNC: 58 MG/DL — HIGH (ref 7–18)
CALCIUM SERPL-MCNC: 9.1 MG/DL — SIGNIFICANT CHANGE UP (ref 8.4–10.5)
CHLORIDE SERPL-SCNC: 115 MMOL/L — HIGH (ref 96–108)
CO2 SERPL-SCNC: 29 MMOL/L — SIGNIFICANT CHANGE UP (ref 22–31)
CREAT ?TM UR-MCNC: 84 MG/DL — SIGNIFICANT CHANGE UP
CREAT SERPL-MCNC: 1.51 MG/DL — HIGH (ref 0.5–1.3)
GLUCOSE SERPL-MCNC: 258 MG/DL — HIGH (ref 70–99)
HCO3 BLDA-SCNC: 32 MMOL/L — HIGH (ref 23–27)
HCT VFR BLD CALC: 49.3 % — HIGH (ref 34.5–45)
HGB BLD-MCNC: 15.3 G/DL — SIGNIFICANT CHANGE UP (ref 11.5–15.5)
HOROWITZ INDEX BLDA+IHG-RTO: 32 — SIGNIFICANT CHANGE UP
LYMPHOCYTES # BLD AUTO: 4 % — LOW (ref 13–44)
MCHC RBC-ENTMCNC: 26.6 PG — LOW (ref 27–34)
MCHC RBC-ENTMCNC: 31.1 GM/DL — LOW (ref 32–36)
MCV RBC AUTO: 85.5 FL — SIGNIFICANT CHANGE UP (ref 80–100)
MONOCYTES NFR BLD AUTO: 5 % — SIGNIFICANT CHANGE UP (ref 2–14)
NEUTROPHILS NFR BLD AUTO: 90 % — HIGH (ref 43–77)
OSMOLALITY UR: 707 MOS/KG — SIGNIFICANT CHANGE UP (ref 50–1200)
PCO2 BLDA: 45 MMHG — SIGNIFICANT CHANGE UP (ref 32–46)
PH BLDA: 7.46 — HIGH (ref 7.35–7.45)
PLATELET # BLD AUTO: 275 K/UL — SIGNIFICANT CHANGE UP (ref 150–400)
PO2 BLDA: 57 MMHG — LOW (ref 74–108)
POTASSIUM SERPL-MCNC: 3.3 MMOL/L — LOW (ref 3.5–5.3)
POTASSIUM SERPL-SCNC: 3.3 MMOL/L — LOW (ref 3.5–5.3)
PROT ?TM UR-MCNC: 116 MG/DL — HIGH (ref 0–12)
RBC # BLD: 5.77 M/UL — HIGH (ref 3.8–5.2)
RBC # FLD: 14.2 % — SIGNIFICANT CHANGE UP (ref 10.3–14.5)
SAO2 % BLDA: 89 % — LOW (ref 92–96)
SODIUM SERPL-SCNC: 156 MMOL/L — HIGH (ref 135–145)
SODIUM UR-SCNC: 23 MMOL/L — LOW (ref 40–220)
TSH SERPL-MCNC: 0.11 UU/ML — LOW (ref 0.34–4.82)
WBC # BLD: 22.7 K/UL — HIGH (ref 3.8–10.5)
WBC # FLD AUTO: 22.7 K/UL — HIGH (ref 3.8–10.5)

## 2017-02-25 RX ORDER — METOPROLOL TARTRATE 50 MG
25 TABLET ORAL EVERY 12 HOURS
Qty: 0 | Refills: 0 | Status: DISCONTINUED | OUTPATIENT
Start: 2017-02-25 | End: 2017-03-06

## 2017-02-25 RX ORDER — MORPHINE SULFATE 50 MG/1
2 CAPSULE, EXTENDED RELEASE ORAL ONCE
Qty: 0 | Refills: 0 | Status: DISCONTINUED | OUTPATIENT
Start: 2017-02-25 | End: 2017-02-25

## 2017-02-25 RX ORDER — IPRATROPIUM/ALBUTEROL SULFATE 18-103MCG
3 AEROSOL WITH ADAPTER (GRAM) INHALATION EVERY 4 HOURS
Qty: 0 | Refills: 0 | Status: DISCONTINUED | OUTPATIENT
Start: 2017-02-25 | End: 2017-03-03

## 2017-02-25 RX ORDER — SODIUM CHLORIDE 9 MG/ML
1000 INJECTION, SOLUTION INTRAVENOUS
Qty: 0 | Refills: 0 | Status: COMPLETED | OUTPATIENT
Start: 2017-02-25 | End: 2017-02-25

## 2017-02-25 RX ORDER — POTASSIUM CHLORIDE 20 MEQ
40 PACKET (EA) ORAL ONCE
Qty: 0 | Refills: 0 | Status: DISCONTINUED | OUTPATIENT
Start: 2017-02-25 | End: 2017-02-25

## 2017-02-25 RX ORDER — POTASSIUM CHLORIDE 20 MEQ
40 PACKET (EA) ORAL ONCE
Qty: 0 | Refills: 0 | Status: COMPLETED | OUTPATIENT
Start: 2017-02-25 | End: 2017-02-25

## 2017-02-25 RX ADMIN — AMLODIPINE BESYLATE 5 MILLIGRAM(S): 2.5 TABLET ORAL at 05:46

## 2017-02-25 RX ADMIN — ENOXAPARIN SODIUM 40 MILLIGRAM(S): 100 INJECTION SUBCUTANEOUS at 11:49

## 2017-02-25 RX ADMIN — Medication 100 MILLIGRAM(S): at 13:49

## 2017-02-25 RX ADMIN — SODIUM CHLORIDE 75 MILLILITER(S): 9 INJECTION, SOLUTION INTRAVENOUS at 10:21

## 2017-02-25 RX ADMIN — Medication 40 MILLIGRAM(S): at 11:50

## 2017-02-25 RX ADMIN — SIMVASTATIN 40 MILLIGRAM(S): 20 TABLET, FILM COATED ORAL at 22:45

## 2017-02-25 RX ADMIN — Medication 500 MICROGRAM(S): at 08:12

## 2017-02-25 RX ADMIN — MORPHINE SULFATE 2 MILLIGRAM(S): 50 CAPSULE, EXTENDED RELEASE ORAL at 20:40

## 2017-02-25 RX ADMIN — Medication 2: at 17:10

## 2017-02-25 RX ADMIN — Medication 1 TABLET(S): at 05:46

## 2017-02-25 RX ADMIN — Medication 100 MILLIGRAM(S): at 22:45

## 2017-02-25 RX ADMIN — Medication 1 APPLICATION(S): at 13:49

## 2017-02-25 RX ADMIN — Medication 0.25 MILLIGRAM(S): at 05:48

## 2017-02-25 RX ADMIN — Medication 2: at 11:50

## 2017-02-25 RX ADMIN — MORPHINE SULFATE 2 MILLIGRAM(S): 50 CAPSULE, EXTENDED RELEASE ORAL at 00:48

## 2017-02-25 RX ADMIN — Medication 81 MILLIGRAM(S): at 11:49

## 2017-02-25 RX ADMIN — Medication 100 MILLIGRAM(S): at 05:47

## 2017-02-25 RX ADMIN — Medication 40 MILLIEQUIVALENT(S): at 11:50

## 2017-02-25 RX ADMIN — Medication 12.5 MILLIGRAM(S): at 05:46

## 2017-02-25 RX ADMIN — Medication 1 APPLICATION(S): at 22:50

## 2017-02-25 RX ADMIN — MORPHINE SULFATE 2 MILLIGRAM(S): 50 CAPSULE, EXTENDED RELEASE ORAL at 20:09

## 2017-02-25 RX ADMIN — Medication 1 APPLICATION(S): at 05:47

## 2017-02-25 RX ADMIN — Medication 500 MICROGRAM(S): at 14:39

## 2017-02-25 RX ADMIN — Medication 25 MILLIGRAM(S): at 19:04

## 2017-02-25 RX ADMIN — Medication 2: at 07:58

## 2017-02-25 RX ADMIN — Medication 40 MILLIGRAM(S): at 05:47

## 2017-02-25 RX ADMIN — Medication 40 MILLIGRAM(S): at 17:10

## 2017-02-25 RX ADMIN — MORPHINE SULFATE 2 MILLIGRAM(S): 50 CAPSULE, EXTENDED RELEASE ORAL at 07:58

## 2017-02-25 RX ADMIN — MEMANTINE HYDROCHLORIDE 5 MILLIGRAM(S): 10 TABLET ORAL at 05:46

## 2017-02-25 RX ADMIN — Medication 100 MILLIGRAM(S): at 05:46

## 2017-02-25 RX ADMIN — Medication 3 MILLILITER(S): at 21:26

## 2017-02-25 RX ADMIN — Medication 40 MILLIGRAM(S): at 00:48

## 2017-02-25 RX ADMIN — MORPHINE SULFATE 2 MILLIGRAM(S): 50 CAPSULE, EXTENDED RELEASE ORAL at 08:20

## 2017-02-25 RX ADMIN — SODIUM CHLORIDE 75 MILLILITER(S): 9 INJECTION, SOLUTION INTRAVENOUS at 22:47

## 2017-02-26 LAB
ANION GAP SERPL CALC-SCNC: 10 MMOL/L — SIGNIFICANT CHANGE UP (ref 5–17)
BASOPHILS # BLD AUTO: 0.1 K/UL — SIGNIFICANT CHANGE UP (ref 0–0.2)
BASOPHILS NFR BLD AUTO: 0.3 % — SIGNIFICANT CHANGE UP (ref 0–2)
BUN SERPL-MCNC: 62 MG/DL — HIGH (ref 7–18)
CALCIUM SERPL-MCNC: 9.1 MG/DL — SIGNIFICANT CHANGE UP (ref 8.4–10.5)
CHLORIDE SERPL-SCNC: 115 MMOL/L — HIGH (ref 96–108)
CO2 SERPL-SCNC: 30 MMOL/L — SIGNIFICANT CHANGE UP (ref 22–31)
CREAT SERPL-MCNC: 1.43 MG/DL — HIGH (ref 0.5–1.3)
EOSINOPHIL # BLD AUTO: 0 K/UL — SIGNIFICANT CHANGE UP (ref 0–0.5)
EOSINOPHIL NFR BLD AUTO: 0 % — SIGNIFICANT CHANGE UP (ref 0–6)
GLUCOSE SERPL-MCNC: 303 MG/DL — HIGH (ref 70–99)
HCT VFR BLD CALC: 47.6 % — HIGH (ref 34.5–45)
HGB BLD-MCNC: 14.8 G/DL — SIGNIFICANT CHANGE UP (ref 11.5–15.5)
LYMPHOCYTES # BLD AUTO: 0.9 K/UL — LOW (ref 1–3.3)
LYMPHOCYTES # BLD AUTO: 3.8 % — LOW (ref 13–44)
MAGNESIUM SERPL-MCNC: 2.6 MG/DL — HIGH (ref 1.8–2.4)
MCHC RBC-ENTMCNC: 26.8 PG — LOW (ref 27–34)
MCHC RBC-ENTMCNC: 31.2 GM/DL — LOW (ref 32–36)
MCV RBC AUTO: 86 FL — SIGNIFICANT CHANGE UP (ref 80–100)
MONOCYTES # BLD AUTO: 0.8 K/UL — SIGNIFICANT CHANGE UP (ref 0–0.9)
MONOCYTES NFR BLD AUTO: 3.3 % — SIGNIFICANT CHANGE UP (ref 2–14)
NEUTROPHILS # BLD AUTO: 22.2 K/UL — HIGH (ref 1.8–7.4)
NEUTROPHILS NFR BLD AUTO: 92.5 % — HIGH (ref 43–77)
PHOSPHATE SERPL-MCNC: 2.2 MG/DL — LOW (ref 2.5–4.5)
PLATELET # BLD AUTO: 209 K/UL — SIGNIFICANT CHANGE UP (ref 150–400)
POTASSIUM SERPL-MCNC: 3.9 MMOL/L — SIGNIFICANT CHANGE UP (ref 3.5–5.3)
POTASSIUM SERPL-SCNC: 3.9 MMOL/L — SIGNIFICANT CHANGE UP (ref 3.5–5.3)
PROCALCITONIN SERPL-MCNC: 0.27 NG/ML — HIGH (ref 0–0.05)
RBC # BLD: 5.54 M/UL — HIGH (ref 3.8–5.2)
RBC # FLD: 14.1 % — SIGNIFICANT CHANGE UP (ref 10.3–14.5)
SODIUM SERPL-SCNC: 155 MMOL/L — HIGH (ref 135–145)
WBC # BLD: 24 K/UL — HIGH (ref 3.8–10.5)
WBC # FLD AUTO: 24 K/UL — HIGH (ref 3.8–10.5)

## 2017-02-26 RX ORDER — PIPERACILLIN AND TAZOBACTAM 4; .5 G/20ML; G/20ML
3.38 INJECTION, POWDER, LYOPHILIZED, FOR SOLUTION INTRAVENOUS ONCE
Qty: 0 | Refills: 0 | Status: COMPLETED | OUTPATIENT
Start: 2017-02-26 | End: 2017-02-26

## 2017-02-26 RX ORDER — PIPERACILLIN AND TAZOBACTAM 4; .5 G/20ML; G/20ML
3.38 INJECTION, POWDER, LYOPHILIZED, FOR SOLUTION INTRAVENOUS EVERY 8 HOURS
Qty: 0 | Refills: 0 | Status: DISCONTINUED | OUTPATIENT
Start: 2017-02-26 | End: 2017-03-03

## 2017-02-26 RX ORDER — SODIUM CHLORIDE 9 MG/ML
1000 INJECTION, SOLUTION INTRAVENOUS
Qty: 0 | Refills: 0 | Status: DISCONTINUED | OUTPATIENT
Start: 2017-02-26 | End: 2017-02-28

## 2017-02-26 RX ADMIN — Medication 40 MILLIGRAM(S): at 06:05

## 2017-02-26 RX ADMIN — PIPERACILLIN AND TAZOBACTAM 200 GRAM(S): 4; .5 INJECTION, POWDER, LYOPHILIZED, FOR SOLUTION INTRAVENOUS at 13:27

## 2017-02-26 RX ADMIN — MORPHINE SULFATE 2 MILLIGRAM(S): 50 CAPSULE, EXTENDED RELEASE ORAL at 03:36

## 2017-02-26 RX ADMIN — Medication 40 MILLIGRAM(S): at 00:19

## 2017-02-26 RX ADMIN — Medication 1 APPLICATION(S): at 06:06

## 2017-02-26 RX ADMIN — Medication 3: at 17:02

## 2017-02-26 RX ADMIN — ENOXAPARIN SODIUM 40 MILLIGRAM(S): 100 INJECTION SUBCUTANEOUS at 12:13

## 2017-02-26 RX ADMIN — Medication 3 MILLILITER(S): at 14:23

## 2017-02-26 RX ADMIN — Medication 1 APPLICATION(S): at 13:28

## 2017-02-26 RX ADMIN — MORPHINE SULFATE 2 MILLIGRAM(S): 50 CAPSULE, EXTENDED RELEASE ORAL at 04:10

## 2017-02-26 RX ADMIN — Medication 3 MILLILITER(S): at 06:29

## 2017-02-26 RX ADMIN — Medication 3: at 12:13

## 2017-02-26 RX ADMIN — PIPERACILLIN AND TAZOBACTAM 25 GRAM(S): 4; .5 INJECTION, POWDER, LYOPHILIZED, FOR SOLUTION INTRAVENOUS at 22:48

## 2017-02-26 RX ADMIN — Medication 40 MILLIGRAM(S): at 17:03

## 2017-02-26 RX ADMIN — Medication 3 MILLILITER(S): at 21:00

## 2017-02-26 RX ADMIN — Medication 100 MILLIGRAM(S): at 06:05

## 2017-02-26 RX ADMIN — Medication 1 APPLICATION(S): at 22:49

## 2017-02-26 RX ADMIN — SODIUM CHLORIDE 50 MILLILITER(S): 9 INJECTION, SOLUTION INTRAVENOUS at 10:34

## 2017-02-26 RX ADMIN — Medication 3: at 07:51

## 2017-02-26 RX ADMIN — Medication 3 MILLILITER(S): at 09:03

## 2017-02-26 RX ADMIN — Medication 3 MILLILITER(S): at 17:53

## 2017-02-27 LAB
ANION GAP SERPL CALC-SCNC: 9 MMOL/L — SIGNIFICANT CHANGE UP (ref 5–17)
BASOPHILS # BLD AUTO: 0.1 K/UL — SIGNIFICANT CHANGE UP (ref 0–0.2)
BASOPHILS NFR BLD AUTO: 0.2 % — SIGNIFICANT CHANGE UP (ref 0–2)
BUN SERPL-MCNC: 52 MG/DL — HIGH (ref 7–18)
CALCIUM SERPL-MCNC: 8.8 MG/DL — SIGNIFICANT CHANGE UP (ref 8.4–10.5)
CHLORIDE SERPL-SCNC: 113 MMOL/L — HIGH (ref 96–108)
CO2 SERPL-SCNC: 30 MMOL/L — SIGNIFICANT CHANGE UP (ref 22–31)
CREAT SERPL-MCNC: 1.31 MG/DL — HIGH (ref 0.5–1.3)
EOSINOPHIL # BLD AUTO: 0 K/UL — SIGNIFICANT CHANGE UP (ref 0–0.5)
EOSINOPHIL NFR BLD AUTO: 0.1 % — SIGNIFICANT CHANGE UP (ref 0–6)
GLUCOSE SERPL-MCNC: 386 MG/DL — HIGH (ref 70–99)
HCT VFR BLD CALC: 44.6 % — SIGNIFICANT CHANGE UP (ref 34.5–45)
HGB BLD-MCNC: 14.1 G/DL — SIGNIFICANT CHANGE UP (ref 11.5–15.5)
LYMPHOCYTES # BLD AUTO: 0.8 K/UL — LOW (ref 1–3.3)
LYMPHOCYTES # BLD AUTO: 3 % — LOW (ref 13–44)
MCHC RBC-ENTMCNC: 27.2 PG — SIGNIFICANT CHANGE UP (ref 27–34)
MCHC RBC-ENTMCNC: 31.6 GM/DL — LOW (ref 32–36)
MCV RBC AUTO: 86 FL — SIGNIFICANT CHANGE UP (ref 80–100)
MONOCYTES # BLD AUTO: 0.8 K/UL — SIGNIFICANT CHANGE UP (ref 0–0.9)
MONOCYTES NFR BLD AUTO: 3.3 % — SIGNIFICANT CHANGE UP (ref 2–14)
NEUTROPHILS # BLD AUTO: 23.1 K/UL — HIGH (ref 1.8–7.4)
NEUTROPHILS NFR BLD AUTO: 93.4 % — HIGH (ref 43–77)
PLATELET # BLD AUTO: 203 K/UL — SIGNIFICANT CHANGE UP (ref 150–400)
POTASSIUM SERPL-MCNC: 3.4 MMOL/L — LOW (ref 3.5–5.3)
POTASSIUM SERPL-SCNC: 3.4 MMOL/L — LOW (ref 3.5–5.3)
RBC # BLD: 5.19 M/UL — SIGNIFICANT CHANGE UP (ref 3.8–5.2)
RBC # FLD: 14.4 % — SIGNIFICANT CHANGE UP (ref 10.3–14.5)
SODIUM SERPL-SCNC: 152 MMOL/L — HIGH (ref 135–145)
WBC # BLD: 24.8 K/UL — HIGH (ref 3.8–10.5)
WBC # FLD AUTO: 24.8 K/UL — HIGH (ref 3.8–10.5)

## 2017-02-27 PROCEDURE — 71010: CPT | Mod: 26

## 2017-02-27 RX ORDER — POTASSIUM CHLORIDE 20 MEQ
10 PACKET (EA) ORAL
Qty: 0 | Refills: 0 | Status: COMPLETED | OUTPATIENT
Start: 2017-02-27 | End: 2017-02-27

## 2017-02-27 RX ORDER — MORPHINE SULFATE 50 MG/1
2 CAPSULE, EXTENDED RELEASE ORAL EVERY 6 HOURS
Qty: 0 | Refills: 0 | Status: DISCONTINUED | OUTPATIENT
Start: 2017-02-27 | End: 2017-03-03

## 2017-02-27 RX ADMIN — Medication 3 MILLILITER(S): at 21:01

## 2017-02-27 RX ADMIN — PIPERACILLIN AND TAZOBACTAM 25 GRAM(S): 4; .5 INJECTION, POWDER, LYOPHILIZED, FOR SOLUTION INTRAVENOUS at 22:00

## 2017-02-27 RX ADMIN — PIPERACILLIN AND TAZOBACTAM 25 GRAM(S): 4; .5 INJECTION, POWDER, LYOPHILIZED, FOR SOLUTION INTRAVENOUS at 13:44

## 2017-02-27 RX ADMIN — Medication 3 MILLILITER(S): at 14:25

## 2017-02-27 RX ADMIN — Medication 3 MILLILITER(S): at 02:45

## 2017-02-27 RX ADMIN — SODIUM CHLORIDE 50 MILLILITER(S): 9 INJECTION, SOLUTION INTRAVENOUS at 05:48

## 2017-02-27 RX ADMIN — Medication 40 MILLIGRAM(S): at 17:29

## 2017-02-27 RX ADMIN — Medication 100 MILLIEQUIVALENT(S): at 10:24

## 2017-02-27 RX ADMIN — Medication 3 MILLILITER(S): at 05:35

## 2017-02-27 RX ADMIN — ENOXAPARIN SODIUM 40 MILLIGRAM(S): 100 INJECTION SUBCUTANEOUS at 11:54

## 2017-02-27 RX ADMIN — Medication 40 MILLIGRAM(S): at 05:48

## 2017-02-27 RX ADMIN — Medication 1 APPLICATION(S): at 13:45

## 2017-02-27 RX ADMIN — SODIUM CHLORIDE 50 MILLILITER(S): 9 INJECTION, SOLUTION INTRAVENOUS at 05:30

## 2017-02-27 RX ADMIN — Medication 5: at 16:02

## 2017-02-27 RX ADMIN — Medication 1 APPLICATION(S): at 05:30

## 2017-02-27 RX ADMIN — Medication 5: at 11:54

## 2017-02-27 RX ADMIN — PIPERACILLIN AND TAZOBACTAM 25 GRAM(S): 4; .5 INJECTION, POWDER, LYOPHILIZED, FOR SOLUTION INTRAVENOUS at 05:30

## 2017-02-27 RX ADMIN — Medication 3 MILLILITER(S): at 17:47

## 2017-02-27 RX ADMIN — SIMVASTATIN 40 MILLIGRAM(S): 20 TABLET, FILM COATED ORAL at 22:00

## 2017-02-27 RX ADMIN — Medication 1 APPLICATION(S): at 22:00

## 2017-02-27 RX ADMIN — Medication 3 MILLILITER(S): at 09:59

## 2017-02-28 LAB
ANION GAP SERPL CALC-SCNC: 9 MMOL/L — SIGNIFICANT CHANGE UP (ref 5–17)
BASE EXCESS BLDA CALC-SCNC: 6 MMOL/L — HIGH (ref -2–2)
BLOOD GAS COMMENTS ARTERIAL: SIGNIFICANT CHANGE UP
BUN SERPL-MCNC: 52 MG/DL — HIGH (ref 7–18)
CALCIUM SERPL-MCNC: 8.6 MG/DL — SIGNIFICANT CHANGE UP (ref 8.4–10.5)
CHLORIDE SERPL-SCNC: 112 MMOL/L — HIGH (ref 96–108)
CO2 SERPL-SCNC: 30 MMOL/L — SIGNIFICANT CHANGE UP (ref 22–31)
CREAT SERPL-MCNC: 1.26 MG/DL — SIGNIFICANT CHANGE UP (ref 0.5–1.3)
GLUCOSE SERPL-MCNC: 426 MG/DL — HIGH (ref 70–99)
HCO3 BLDA-SCNC: 29 MMOL/L — HIGH (ref 23–27)
HCT VFR BLD CALC: 42.3 % — SIGNIFICANT CHANGE UP (ref 34.5–45)
HGB BLD-MCNC: 13.6 G/DL — SIGNIFICANT CHANGE UP (ref 11.5–15.5)
HOROWITZ INDEX BLDA+IHG-RTO: 50 — SIGNIFICANT CHANGE UP
MAGNESIUM SERPL-MCNC: 2.6 MG/DL — HIGH (ref 1.8–2.4)
MCHC RBC-ENTMCNC: 27.4 PG — SIGNIFICANT CHANGE UP (ref 27–34)
MCHC RBC-ENTMCNC: 32.1 GM/DL — SIGNIFICANT CHANGE UP (ref 32–36)
MCV RBC AUTO: 85.5 FL — SIGNIFICANT CHANGE UP (ref 80–100)
PCO2 BLDA: 38 MMHG — SIGNIFICANT CHANGE UP (ref 32–46)
PH BLDA: 7.5 — HIGH (ref 7.35–7.45)
PHOSPHATE SERPL-MCNC: 1.9 MG/DL — LOW (ref 2.5–4.5)
PLATELET # BLD AUTO: 209 K/UL — SIGNIFICANT CHANGE UP (ref 150–400)
PO2 BLDA: 74 MMHG — SIGNIFICANT CHANGE UP (ref 74–108)
POTASSIUM SERPL-MCNC: 3.6 MMOL/L — SIGNIFICANT CHANGE UP (ref 3.5–5.3)
POTASSIUM SERPL-SCNC: 3.6 MMOL/L — SIGNIFICANT CHANGE UP (ref 3.5–5.3)
RBC # BLD: 4.95 M/UL — SIGNIFICANT CHANGE UP (ref 3.8–5.2)
RBC # FLD: 14.3 % — SIGNIFICANT CHANGE UP (ref 10.3–14.5)
SAO2 % BLDA: 96 % — SIGNIFICANT CHANGE UP (ref 92–96)
SODIUM SERPL-SCNC: 151 MMOL/L — HIGH (ref 135–145)
WBC # BLD: 20.1 K/UL — HIGH (ref 3.8–10.5)
WBC # FLD AUTO: 20.1 K/UL — HIGH (ref 3.8–10.5)

## 2017-02-28 RX ORDER — POTASSIUM PHOSPHATE, MONOBASIC POTASSIUM PHOSPHATE, DIBASIC 236; 224 MG/ML; MG/ML
15 INJECTION, SOLUTION INTRAVENOUS ONCE
Qty: 0 | Refills: 0 | Status: COMPLETED | OUTPATIENT
Start: 2017-02-28 | End: 2017-02-28

## 2017-02-28 RX ORDER — INSULIN LISPRO 100/ML
VIAL (ML) SUBCUTANEOUS EVERY 6 HOURS
Qty: 0 | Refills: 0 | Status: DISCONTINUED | OUTPATIENT
Start: 2017-02-28 | End: 2017-02-28

## 2017-02-28 RX ORDER — ACETYLCYSTEINE 200 MG/ML
3 VIAL (ML) MISCELLANEOUS EVERY 6 HOURS
Qty: 0 | Refills: 0 | Status: DISCONTINUED | OUTPATIENT
Start: 2017-02-28 | End: 2017-03-03

## 2017-02-28 RX ORDER — INSULIN LISPRO 100/ML
VIAL (ML) SUBCUTANEOUS
Qty: 0 | Refills: 0 | Status: DISCONTINUED | OUTPATIENT
Start: 2017-02-28 | End: 2017-03-03

## 2017-02-28 RX ADMIN — MEMANTINE HYDROCHLORIDE 5 MILLIGRAM(S): 10 TABLET ORAL at 05:34

## 2017-02-28 RX ADMIN — Medication 3 MILLILITER(S): at 09:08

## 2017-02-28 RX ADMIN — SIMVASTATIN 40 MILLIGRAM(S): 20 TABLET, FILM COATED ORAL at 21:44

## 2017-02-28 RX ADMIN — Medication 40 MILLIGRAM(S): at 06:47

## 2017-02-28 RX ADMIN — Medication 100 MILLIGRAM(S): at 05:36

## 2017-02-28 RX ADMIN — PIPERACILLIN AND TAZOBACTAM 25 GRAM(S): 4; .5 INJECTION, POWDER, LYOPHILIZED, FOR SOLUTION INTRAVENOUS at 21:44

## 2017-02-28 RX ADMIN — Medication 100 MILLIGRAM(S): at 17:27

## 2017-02-28 RX ADMIN — Medication 3 MILLILITER(S): at 02:11

## 2017-02-28 RX ADMIN — Medication 25 MILLIGRAM(S): at 17:27

## 2017-02-28 RX ADMIN — MEMANTINE HYDROCHLORIDE 5 MILLIGRAM(S): 10 TABLET ORAL at 17:27

## 2017-02-28 RX ADMIN — Medication 1 TABLET(S): at 05:34

## 2017-02-28 RX ADMIN — AMLODIPINE BESYLATE 5 MILLIGRAM(S): 2.5 TABLET ORAL at 05:34

## 2017-02-28 RX ADMIN — Medication 3 MILLILITER(S): at 14:18

## 2017-02-28 RX ADMIN — POTASSIUM PHOSPHATE, MONOBASIC POTASSIUM PHOSPHATE, DIBASIC 62.5 MILLIMOLE(S): 236; 224 INJECTION, SOLUTION INTRAVENOUS at 11:37

## 2017-02-28 RX ADMIN — Medication 5: at 08:24

## 2017-02-28 RX ADMIN — PIPERACILLIN AND TAZOBACTAM 25 GRAM(S): 4; .5 INJECTION, POWDER, LYOPHILIZED, FOR SOLUTION INTRAVENOUS at 14:33

## 2017-02-28 RX ADMIN — Medication 1 APPLICATION(S): at 21:44

## 2017-02-28 RX ADMIN — Medication 3 MILLILITER(S): at 17:01

## 2017-02-28 RX ADMIN — MORPHINE SULFATE 2 MILLIGRAM(S): 50 CAPSULE, EXTENDED RELEASE ORAL at 14:33

## 2017-02-28 RX ADMIN — Medication 1 TABLET(S): at 17:27

## 2017-02-28 RX ADMIN — Medication 12: at 17:27

## 2017-02-28 RX ADMIN — Medication 81 MILLIGRAM(S): at 11:38

## 2017-02-28 RX ADMIN — ENOXAPARIN SODIUM 40 MILLIGRAM(S): 100 INJECTION SUBCUTANEOUS at 11:38

## 2017-02-28 RX ADMIN — Medication 3 MILLILITER(S): at 21:12

## 2017-02-28 RX ADMIN — SODIUM CHLORIDE 50 MILLILITER(S): 9 INJECTION, SOLUTION INTRAVENOUS at 06:46

## 2017-02-28 RX ADMIN — Medication 3 MILLILITER(S): at 05:44

## 2017-02-28 RX ADMIN — Medication 1 APPLICATION(S): at 05:35

## 2017-02-28 RX ADMIN — Medication 6: at 11:37

## 2017-02-28 RX ADMIN — Medication 25 MILLIGRAM(S): at 05:34

## 2017-02-28 RX ADMIN — PIPERACILLIN AND TAZOBACTAM 25 GRAM(S): 4; .5 INJECTION, POWDER, LYOPHILIZED, FOR SOLUTION INTRAVENOUS at 05:35

## 2017-02-28 RX ADMIN — Medication 1 DROP(S): at 17:26

## 2017-02-28 RX ADMIN — Medication 1 APPLICATION(S): at 14:33

## 2017-02-28 NOTE — DISCHARGE NOTE ADULT - INSTRUCTIONS
IV hydration - IV hydration - D5 1/2 NS IV hydration - D5 1/2 NS; Dysphagia 1 pureed honey consistency IV hydration - D5 1/2 NS; Dysphagia 1 pureed honey consistency which patient is tolerating.

## 2017-02-28 NOTE — DISCHARGE NOTE ADULT - PATIENT PORTAL LINK FT
“You can access the [facility] Patient Portal, offered by [facility], by registering with the following website: [email or web address]”

## 2017-02-28 NOTE — DISCHARGE NOTE ADULT - SECONDARY DIAGNOSIS.
Alzheimer's dementia COPD (chronic obstructive pulmonary disease) HTN (hypertension) GERD (gastroesophageal reflux disease) HLD (hyperlipidemia) Need for prophylactic measure

## 2017-02-28 NOTE — DISCHARGE NOTE ADULT - HOSPITAL COURSE
89 F from [facility], bed bound, non-verbal,  PMH of Anxiety, HLD, Alzheimers, HTN, Chronic Ischemic heart Disease chf Grade 4 DD , COPD on nasal cannula 2 lt PRN, GERD, SOB, Muscle weakness was sent to ED for SOB, and wheezing  since yesterday  for acute resp failure with hypoxia secondary to COPD exacerbation and PNA. She was initially placed on BIPAP (not hypercapneic) and then changed to highflow. Patient also found to have severe protein calorie malnutrition. 89 F from [facility], bed bound, non-verbal,  PMH of Anxiety, HLD, Alzheimers, HTN, Chronic Ischemic heart Disease chf Grade 4 DD , COPD on nasal cannula 2 lt PRN, GERD, SOB, Muscle weakness was sent to ED for SOB, and wheezing  since yesterday  for acute resp failure with hypoxia secondary to COPD exacerbation and PNA. She was initially placed on BIPAP (not hypercapneic) and then changed to highflow. Patient also found to have severe protein calorie malnutrition.  Although respiratory condition improved and patient weaned off high-flow, She was found to have acute encephalopathy in setting of advanced dementia and not awakening after a couple of days. Overall poor prognosis. 89 F from [facility], bed bound, non-verbal,  PMH of Anxiety, HLD, Alzheimers, HTN, Chronic Ischemic heart Disease chf Grade 4 DD , COPD on nasal cannula 2 lt PRN, GERD, SOB, Muscle weakness was sent to ED for SOB, and wheezing  since yesterday  for acute resp failure with hypoxia secondary to COPD exacerbation and PNA. She was initially placed on BIPAP (not hypercapneic) and then changed to highflow. Patient also found to have severe protein calorie malnutrition.  Although respiratory condition improved and patient weaned off high-flow, She was found to have acute encephalopathy in setting of advanced dementia and not awakening after a couple of days. Overall poor prognosis and deem too high risk for anesthesia for peg placement. Family decided for residential hospice at [place] 89 F from [facility], bed bound, non-verbal,  PMH of Anxiety, HLD, Alzheimers, HTN, Chronic Ischemic heart Disease chf Grade 4 DD , COPD on nasal cannula 2 lt PRN, GERD, SOB, Muscle weakness was sent to ED for SOB, and wheezing  since yesterday  for acute resp failure with hypoxia secondary to COPD exacerbation and PNA. She was initially placed on BIPAP (not hypercapneic) and then changed to highflow. She was treated with IV abx for PNA.  Patient also found to have severe protein calorie malnutrition.  Although respiratory condition improved and patient weaned off high-flow, She was found to have acute encephalopathy in setting of advanced dementia and not awakening after a couple of days. Overall poor prognosis and deem too high risk for anesthesia for peg placement. Family decided for residential hospice at [place] 89 F from [facility], bed bound, non-verbal,  PMH of Anxiety, HLD, Alzheimers, HTN, Chronic Ischemic heart Disease chf Grade 4 DD , COPD on nasal cannula 2 lt PRN, GERD, SOB, Muscle weakness was sent to ED for SOB, and wheezing  since yesterday  for acute resp failure with hypoxia secondary to COPD exacerbation and PNA. She was initially placed on BIPAP (not hypercapneic) and then changed to highflow. She was treated with IV abx for PNA.  Patient also found to have severe protein calorie malnutrition.  Although respiratory condition improved and patient weaned off high-flow, She was found to have acute encephalopathy in setting of advanced dementia. Overall poor prognosis and deemed too high risk for anesthesia for peg placement. Family decided for residential hospice at [place] 89 F from [facility], bed bound, non-verbal,  PMH of Anxiety, HLD, Alzheimers, HTN, Chronic Ischemic heart Disease chf Grade 4 DD , COPD on nasal cannula 2 lt PRN, GERD, SOB, Muscle weakness was sent to ED for SOB, and wheezing  since yesterday  for acute resp failure with hypoxia secondary to COPD exacerbation and PNA. She was initially placed on BIPAP (not hypercapneic) and then changed to highflow. She was treated with IV abx for PNA.  Patient also found to have severe protein calorie malnutrition.  Although respiratory condition improved and patient weaned off high-flow, She was found to have acute encephalopathy in setting of advanced dementia. Overall poor prognosis and deemed too high risk for anesthesia for peg placement. Family decided for residential hospice at [facility]. Will dc to [facility] with IV hydration.

## 2017-02-28 NOTE — DISCHARGE NOTE ADULT - PLAN OF CARE
improved c/w with supplemental 02 continue with support care continue with bronchodilators and duonebs continue with home meds GI ppx continue with current treatment plan DVT ppx improved with supplemental 02 c/w with supplemental Arvind rubi endstage continue with support care endstage continue with bronchodilators and duonebs

## 2017-02-28 NOTE — DISCHARGE NOTE ADULT - CARE PLAN
Principal Discharge DX:	Acute respiratory failure with hypoxia  Goal:	improved  Instructions for follow-up, activity and diet:	c/w with supplemental 02  Secondary Diagnosis:	Alzheimer's dementia  Instructions for follow-up, activity and diet:	continue with support care  Secondary Diagnosis:	COPD (chronic obstructive pulmonary disease)  Instructions for follow-up, activity and diet:	continue with bronchodilators and duonebs  Secondary Diagnosis:	HTN (hypertension)  Instructions for follow-up, activity and diet:	continue with home meds  Secondary Diagnosis:	GERD (gastroesophageal reflux disease)  Instructions for follow-up, activity and diet:	GI ppx  Secondary Diagnosis:	HLD (hyperlipidemia)  Instructions for follow-up, activity and diet:	continue with current treatment plan  Secondary Diagnosis:	Need for prophylactic measure  Instructions for follow-up, activity and diet:	DVT ppx Principal Discharge DX:	Acute respiratory failure with hypoxia  Goal:	improved with supplemental 02  Instructions for follow-up, activity and diet:	c/w with supplemental 02  duonebs  Secondary Diagnosis:	Alzheimer's dementia  Instructions for follow-up, activity and diet:	continue with support care  Secondary Diagnosis:	COPD (chronic obstructive pulmonary disease)  Instructions for follow-up, activity and diet:	continue with bronchodilators and duonebs  Secondary Diagnosis:	HTN (hypertension)  Instructions for follow-up, activity and diet:	continue with home meds  Secondary Diagnosis:	GERD (gastroesophageal reflux disease)  Instructions for follow-up, activity and diet:	GI ppx  Secondary Diagnosis:	HLD (hyperlipidemia)  Instructions for follow-up, activity and diet:	continue with current treatment plan  Secondary Diagnosis:	Need for prophylactic measure  Instructions for follow-up, activity and diet:	DVT ppx Principal Discharge DX:	Acute respiratory failure with hypoxia  Goal:	improved with supplemental 02  Instructions for follow-up, activity and diet:	c/w with supplemental 02  duonebs  Secondary Diagnosis:	Alzheimer's dementia  Instructions for follow-up, activity and diet:	endstage continue with support care  Secondary Diagnosis:	COPD (chronic obstructive pulmonary disease)  Instructions for follow-up, activity and diet:	endstage continue with bronchodilators and duonebs  Secondary Diagnosis:	HTN (hypertension)  Instructions for follow-up, activity and diet:	continue with home meds

## 2017-02-28 NOTE — DISCHARGE NOTE ADULT - CARE PROVIDER_API CALL
Bert Funes (DO), Medicine  [address] [place] [address] [address]  [place] [address]  Phone: [phone]  Fax: [phone]

## 2017-02-28 NOTE — DISCHARGE NOTE ADULT - MEDICATION SUMMARY - MEDICATIONS TO STOP TAKING
I will STOP taking the medications listed below when I get home from the hospital:    Advair Diskus 250 mcg-50 mcg inhalation powder  -- 1 puff(s) inhaled 2 times a day    Calcium 500+D oral tablet, chewable  -- 1 tab(s) by mouth once a day    simvastatin 40 mg oral tablet  -- 1 tab(s) by mouth once a day (at bedtime)    memantine 5 mg oral tablet  -- 1 tab(s) by mouth 2 times a day I will STOP taking the medications listed below when I get home from the hospital:    Advair Diskus 250 mcg-50 mcg inhalation powder  -- 1 puff(s) inhaled 2 times a day    Calcium 500+D oral tablet, chewable  -- 1 tab(s) by mouth once a day    Colace 100 mg oral capsule  -- 1 cap(s) by mouth 2 times a day    simvastatin 40 mg oral tablet  -- 1 tab(s) by mouth once a day (at bedtime)    aspirin 81 mg oral tablet, chewable  -- 1 tab(s) by mouth once a day    memantine 5 mg oral tablet  -- 1 tab(s) by mouth 2 times a day    metoprolol tartrate 25 mg oral tablet  -- 1 tab(s) by mouth 2 times a day    amLODIPine 5 mg oral tablet  -- 1 tab(s) by mouth once a day

## 2017-02-28 NOTE — DISCHARGE NOTE ADULT - MEDICATION SUMMARY - MEDICATIONS TO TAKE
I will START or STAY ON the medications listed below when I get home from the hospital:    aspirin 81 mg oral tablet, chewable  -- 1 tab(s) by mouth once a day  -- Indication: For CHF (congestive heart failure)    acetaminophen 650 mg rectal suppository  -- 1 suppository(ies) rectally 4 times a day, As needed, Fever  -- Indication: For fever    morphine 20 mg/mL oral concentrate  -- 0.25 milliliter(s) by mouth every 2 hours, As needed, tachypnea, labored breathing  -- Indication: For SOB    metoprolol tartrate 25 mg oral tablet  -- 1 tab(s) by mouth 2 times a day  -- Indication: For CHF (congestive heart failure)    albuterol 2.5 mg/3 mL (0.083%) inhalation solution  -- 3 milliliter(s) inhaled every 6 hours  -- Indication: For COPD (chronic obstructive pulmonary disease)    budesonide-formoterol 160 mcg-4.5 mcg/inh inhalation aerosol  -- 2 puff(s) inhaled 2 times a day  -- Indication: For COPD (chronic obstructive pulmonary disease)    ipratropium 500 mcg/2.5 mL inhalation solution  -- 2.5 milliliter(s) inhaled once a day  -- Indication: For COPD (chronic obstructive pulmonary disease)    amLODIPine 5 mg oral tablet  -- 1 tab(s) by mouth once a day  -- Indication: For HTN (hypertension)    modafinil 200 mg oral tablet  -- 1 tab(s) by mouth once a day (before a meal)  -- Indication: For Alzheimer's dementia    hydrocortisone 1% topical cream  -- 1 application on skin 3 times a day  -- Indication: For skin    Colace 100 mg oral capsule  -- 1 cap(s) by mouth 2 times a day  -- Indication: For COnstipation    ocular lubricant ophthalmic solution  -- 1 drop(s) to each affected eye 2 times a day  -- Indication: For dry eyes I will START or STAY ON the medications listed below when I get home from the hospital:    acetaminophen 650 mg rectal suppository  -- 1 suppository(ies) rectally 4 times a day, As needed, Fever  -- Indication: For fever    morphine 20 mg/mL oral concentrate  -- 0.25 milliliter(s) by mouth every 2 hours, As needed, tachypnea, labored breathing  -- Indication: For SOB    albuterol 2.5 mg/3 mL (0.083%) inhalation solution  -- 3 milliliter(s) inhaled every 6 hours  -- Indication: For COPD (chronic obstructive pulmonary disease)    budesonide-formoterol 160 mcg-4.5 mcg/inh inhalation aerosol  -- 2 puff(s) inhaled 2 times a day  -- Indication: For COPD (chronic obstructive pulmonary disease)    ipratropium 500 mcg/2.5 mL inhalation solution  -- 2.5 milliliter(s) inhaled once a day  -- Indication: For COPD (chronic obstructive pulmonary disease)    hydrocortisone 1% topical cream  -- 1 application on skin 3 times a day  -- Indication: For skin    ocular lubricant ophthalmic solution  -- 1 drop(s) to each affected eye 2 times a day  -- Indication: For dry eyes I will START or STAY ON the medications listed below when I get home from the hospital:    acetaminophen 650 mg rectal suppository  -- 1 suppository(ies) rectally 4 times a day, As needed, Fever  -- Indication: For fever    morphine 20 mg/mL oral concentrate  -- 0.25 milliliter(s) by mouth every 2 hours, As needed, tachypnea, labored breathing  -- Indication: For SOB    albuterol 2.5 mg/3 mL (0.083%) inhalation solution  -- 3 milliliter(s) inhaled every 6 hours  -- Indication: For COPD (chronic obstructive pulmonary disease)    budesonide-formoterol 160 mcg-4.5 mcg/inh inhalation aerosol  -- 2 puff(s) inhaled 2 times a day  -- Indication: For COPD (chronic obstructive pulmonary disease)    ipratropium 500 mcg/2.5 mL inhalation solution  -- 2.5 milliliter(s) inhaled once a day  -- Indication: For COPD (chronic obstructive pulmonary disease)    hydrocortisone 1% topical cream  -- 1 application on skin 3 times a day  -- Indication: For skin    Dextrose 5% with 0.45% NaCl intravenous solution  -- 1000 milliliter(s) intravenous   -- Indication: For IV hydration/hospice care     ocular lubricant ophthalmic solution  -- 1 drop(s) to each affected eye 2 times a day  -- Indication: For dry eyes I will START or STAY ON the medications listed below when I get home from the hospital:    acetaminophen 650 mg rectal suppository  -- 1 suppository(ies) rectally 4 times a day, As needed, Fever  -- Indication: For fever    morphine 20 mg/mL oral concentrate  -- 0.25 milliliter(s) by mouth every 2 hours, As needed, tachypnea, labored breathing  -- Indication: For SOB    albuterol 2.5 mg/3 mL (0.083%) inhalation solution  -- 3 milliliter(s) inhaled every 6 hours  -- Indication: For COPD (chronic obstructive pulmonary disease)    budesonide-formoterol 160 mcg-4.5 mcg/inh inhalation aerosol  -- 2 puff(s) inhaled 2 times a day  -- Indication: For COPD (chronic obstructive pulmonary disease)    ipratropium 500 mcg/2.5 mL inhalation solution  -- 2.5 milliliter(s) inhaled once a day  -- Indication: For COPD (chronic obstructive pulmonary disease)    modafinil 200 mg oral tablet  -- 1 tab(s) by mouth once a day (before a meal)  -- Indication: For mental status    hydrocortisone 1% topical cream  -- 1 application on skin 3 times a day  -- Indication: For skin    Dextrose 5% with 0.45% NaCl intravenous solution  -- 1000 milliliter(s) intravenous   -- Indication: For IV hydration/hospice care     ocular lubricant ophthalmic solution  -- 1 drop(s) to each affected eye 2 times a day  -- Indication: For dry eyes

## 2017-03-01 LAB
ANION GAP SERPL CALC-SCNC: 10 MMOL/L — SIGNIFICANT CHANGE UP (ref 5–17)
BUN SERPL-MCNC: 59 MG/DL — HIGH (ref 7–18)
CALCIUM SERPL-MCNC: 8.9 MG/DL — SIGNIFICANT CHANGE UP (ref 8.4–10.5)
CHLORIDE SERPL-SCNC: 114 MMOL/L — HIGH (ref 96–108)
CO2 SERPL-SCNC: 31 MMOL/L — SIGNIFICANT CHANGE UP (ref 22–31)
CREAT SERPL-MCNC: 1.26 MG/DL — SIGNIFICANT CHANGE UP (ref 0.5–1.3)
GLUCOSE SERPL-MCNC: 322 MG/DL — HIGH (ref 70–99)
HCT VFR BLD CALC: 39.4 % — SIGNIFICANT CHANGE UP (ref 34.5–45)
HGB BLD-MCNC: 12.6 G/DL — SIGNIFICANT CHANGE UP (ref 11.5–15.5)
MCHC RBC-ENTMCNC: 27.6 PG — SIGNIFICANT CHANGE UP (ref 27–34)
MCHC RBC-ENTMCNC: 32 GM/DL — SIGNIFICANT CHANGE UP (ref 32–36)
MCV RBC AUTO: 86.3 FL — SIGNIFICANT CHANGE UP (ref 80–100)
PHOSPHATE SERPL-MCNC: 3.1 MG/DL — SIGNIFICANT CHANGE UP (ref 2.5–4.5)
PLATELET # BLD AUTO: 200 K/UL — SIGNIFICANT CHANGE UP (ref 150–400)
POTASSIUM SERPL-MCNC: 3.6 MMOL/L — SIGNIFICANT CHANGE UP (ref 3.5–5.3)
POTASSIUM SERPL-SCNC: 3.6 MMOL/L — SIGNIFICANT CHANGE UP (ref 3.5–5.3)
RBC # BLD: 4.56 M/UL — SIGNIFICANT CHANGE UP (ref 3.8–5.2)
RBC # FLD: 14.9 % — HIGH (ref 10.3–14.5)
SODIUM SERPL-SCNC: 155 MMOL/L — HIGH (ref 135–145)
WBC # BLD: 25.5 K/UL — HIGH (ref 3.8–10.5)
WBC # FLD AUTO: 25.5 K/UL — HIGH (ref 3.8–10.5)

## 2017-03-01 PROCEDURE — 99497 ADVNCD CARE PLAN 30 MIN: CPT | Mod: 25

## 2017-03-01 PROCEDURE — 99223 1ST HOSP IP/OBS HIGH 75: CPT

## 2017-03-01 RX ORDER — MODAFINIL 200 MG/1
100 TABLET ORAL DAILY
Qty: 0 | Refills: 0 | Status: DISCONTINUED | OUTPATIENT
Start: 2017-03-02 | End: 2017-03-02

## 2017-03-01 RX ORDER — AMLODIPINE BESYLATE 2.5 MG/1
10 TABLET ORAL DAILY
Qty: 0 | Refills: 0 | Status: DISCONTINUED | OUTPATIENT
Start: 2017-03-02 | End: 2017-03-06

## 2017-03-01 RX ADMIN — MEMANTINE HYDROCHLORIDE 5 MILLIGRAM(S): 10 TABLET ORAL at 17:20

## 2017-03-01 RX ADMIN — Medication 100 MILLIGRAM(S): at 17:20

## 2017-03-01 RX ADMIN — Medication 8: at 08:03

## 2017-03-01 RX ADMIN — Medication 3 MILLILITER(S): at 18:20

## 2017-03-01 RX ADMIN — Medication 40 MILLIGRAM(S): at 05:59

## 2017-03-01 RX ADMIN — Medication 8: at 17:20

## 2017-03-01 RX ADMIN — Medication 1 APPLICATION(S): at 22:52

## 2017-03-01 RX ADMIN — Medication 3 MILLILITER(S): at 05:21

## 2017-03-01 RX ADMIN — Medication 3 MILLILITER(S): at 02:21

## 2017-03-01 RX ADMIN — Medication 3 MILLILITER(S): at 10:03

## 2017-03-01 RX ADMIN — Medication 1 TABLET(S): at 05:57

## 2017-03-01 RX ADMIN — Medication 100 MILLIGRAM(S): at 05:57

## 2017-03-01 RX ADMIN — AMLODIPINE BESYLATE 5 MILLIGRAM(S): 2.5 TABLET ORAL at 05:57

## 2017-03-01 RX ADMIN — SIMVASTATIN 40 MILLIGRAM(S): 20 TABLET, FILM COATED ORAL at 22:52

## 2017-03-01 RX ADMIN — Medication 1 TABLET(S): at 17:20

## 2017-03-01 RX ADMIN — Medication 25 MILLIGRAM(S): at 17:19

## 2017-03-01 RX ADMIN — Medication 3 MILLILITER(S): at 21:09

## 2017-03-01 RX ADMIN — MEMANTINE HYDROCHLORIDE 5 MILLIGRAM(S): 10 TABLET ORAL at 05:58

## 2017-03-01 RX ADMIN — Medication 1 DROP(S): at 17:19

## 2017-03-01 RX ADMIN — ENOXAPARIN SODIUM 40 MILLIGRAM(S): 100 INJECTION SUBCUTANEOUS at 11:39

## 2017-03-01 RX ADMIN — PIPERACILLIN AND TAZOBACTAM 25 GRAM(S): 4; .5 INJECTION, POWDER, LYOPHILIZED, FOR SOLUTION INTRAVENOUS at 22:52

## 2017-03-01 RX ADMIN — Medication 1 DROP(S): at 05:57

## 2017-03-01 RX ADMIN — Medication 1 APPLICATION(S): at 13:49

## 2017-03-01 RX ADMIN — PIPERACILLIN AND TAZOBACTAM 25 GRAM(S): 4; .5 INJECTION, POWDER, LYOPHILIZED, FOR SOLUTION INTRAVENOUS at 13:49

## 2017-03-01 RX ADMIN — Medication 1 APPLICATION(S): at 05:59

## 2017-03-01 RX ADMIN — Medication 8: at 11:38

## 2017-03-01 RX ADMIN — PIPERACILLIN AND TAZOBACTAM 25 GRAM(S): 4; .5 INJECTION, POWDER, LYOPHILIZED, FOR SOLUTION INTRAVENOUS at 05:59

## 2017-03-01 RX ADMIN — Medication 3 MILLILITER(S): at 14:58

## 2017-03-01 RX ADMIN — Medication 81 MILLIGRAM(S): at 11:39

## 2017-03-01 RX ADMIN — Medication 25 MILLIGRAM(S): at 05:59

## 2017-03-02 LAB
ANION GAP SERPL CALC-SCNC: 10 MMOL/L — SIGNIFICANT CHANGE UP (ref 5–17)
BUN SERPL-MCNC: 56 MG/DL — HIGH (ref 7–18)
CALCIUM SERPL-MCNC: 9 MG/DL — SIGNIFICANT CHANGE UP (ref 8.4–10.5)
CHLORIDE SERPL-SCNC: 112 MMOL/L — HIGH (ref 96–108)
CO2 SERPL-SCNC: 33 MMOL/L — HIGH (ref 22–31)
CREAT SERPL-MCNC: 1.28 MG/DL — SIGNIFICANT CHANGE UP (ref 0.5–1.3)
CULTURE RESULTS: SIGNIFICANT CHANGE UP
GLUCOSE SERPL-MCNC: 296 MG/DL — HIGH (ref 70–99)
HCT VFR BLD CALC: 35.4 % — SIGNIFICANT CHANGE UP (ref 34.5–45)
HGB BLD-MCNC: 11.6 G/DL — SIGNIFICANT CHANGE UP (ref 11.5–15.5)
MCHC RBC-ENTMCNC: 28.1 PG — SIGNIFICANT CHANGE UP (ref 27–34)
MCHC RBC-ENTMCNC: 32.8 GM/DL — SIGNIFICANT CHANGE UP (ref 32–36)
MCV RBC AUTO: 85.8 FL — SIGNIFICANT CHANGE UP (ref 80–100)
PLATELET # BLD AUTO: 170 K/UL — SIGNIFICANT CHANGE UP (ref 150–400)
POTASSIUM SERPL-MCNC: 3.8 MMOL/L — SIGNIFICANT CHANGE UP (ref 3.5–5.3)
POTASSIUM SERPL-SCNC: 3.8 MMOL/L — SIGNIFICANT CHANGE UP (ref 3.5–5.3)
RBC # BLD: 4.13 M/UL — SIGNIFICANT CHANGE UP (ref 3.8–5.2)
RBC # FLD: 14.5 % — SIGNIFICANT CHANGE UP (ref 10.3–14.5)
SODIUM SERPL-SCNC: 155 MMOL/L — HIGH (ref 135–145)
SPECIMEN SOURCE: SIGNIFICANT CHANGE UP
T3 SERPL-MCNC: 41 NG/DL — LOW (ref 80–200)
T3FREE SERPL-MCNC: 1.11 PG/ML — LOW (ref 1.8–4.6)
T4 AB SER-ACNC: 8.2 UG/DL — SIGNIFICANT CHANGE UP (ref 4.6–12)
T4 FREE SERPL-MCNC: 1.8 NG/DL — SIGNIFICANT CHANGE UP (ref 0.9–1.8)
TSH SERPL-MCNC: 0.13 UU/ML — LOW (ref 0.34–4.82)
WBC # BLD: 24.6 K/UL — HIGH (ref 3.8–10.5)
WBC # FLD AUTO: 24.6 K/UL — HIGH (ref 3.8–10.5)

## 2017-03-02 PROCEDURE — 99233 SBSQ HOSP IP/OBS HIGH 50: CPT

## 2017-03-02 RX ORDER — INSULIN GLARGINE 100 [IU]/ML
5 INJECTION, SOLUTION SUBCUTANEOUS AT BEDTIME
Qty: 0 | Refills: 0 | Status: DISCONTINUED | OUTPATIENT
Start: 2017-03-02 | End: 2017-03-03

## 2017-03-02 RX ORDER — SODIUM CHLORIDE 9 MG/ML
1000 INJECTION, SOLUTION INTRAVENOUS
Qty: 0 | Refills: 0 | Status: DISCONTINUED | OUTPATIENT
Start: 2017-03-02 | End: 2017-03-03

## 2017-03-02 RX ORDER — MODAFINIL 200 MG/1
200 TABLET ORAL DAILY
Qty: 0 | Refills: 0 | Status: DISCONTINUED | OUTPATIENT
Start: 2017-03-02 | End: 2017-03-03

## 2017-03-02 RX ORDER — ACETAMINOPHEN 500 MG
1 TABLET ORAL
Qty: 0 | Refills: 0 | COMMUNITY
Start: 2017-03-02

## 2017-03-02 RX ADMIN — Medication 100 MILLIGRAM(S): at 18:12

## 2017-03-02 RX ADMIN — ENOXAPARIN SODIUM 40 MILLIGRAM(S): 100 INJECTION SUBCUTANEOUS at 11:32

## 2017-03-02 RX ADMIN — Medication 81 MILLIGRAM(S): at 11:32

## 2017-03-02 RX ADMIN — Medication 3 MILLILITER(S): at 05:53

## 2017-03-02 RX ADMIN — Medication 3 MILLILITER(S): at 02:54

## 2017-03-02 RX ADMIN — Medication 1 TABLET(S): at 18:12

## 2017-03-02 RX ADMIN — Medication 1 TABLET(S): at 05:34

## 2017-03-02 RX ADMIN — Medication 3 MILLILITER(S): at 18:30

## 2017-03-02 RX ADMIN — Medication 3 MILLILITER(S): at 09:42

## 2017-03-02 RX ADMIN — PIPERACILLIN AND TAZOBACTAM 25 GRAM(S): 4; .5 INJECTION, POWDER, LYOPHILIZED, FOR SOLUTION INTRAVENOUS at 13:09

## 2017-03-02 RX ADMIN — Medication 1 APPLICATION(S): at 21:53

## 2017-03-02 RX ADMIN — Medication 3 MILLILITER(S): at 21:22

## 2017-03-02 RX ADMIN — Medication 1 APPLICATION(S): at 05:36

## 2017-03-02 RX ADMIN — Medication 30 MILLIGRAM(S): at 05:34

## 2017-03-02 RX ADMIN — Medication 25 MILLIGRAM(S): at 18:12

## 2017-03-02 RX ADMIN — Medication 10: at 11:33

## 2017-03-02 RX ADMIN — SIMVASTATIN 40 MILLIGRAM(S): 20 TABLET, FILM COATED ORAL at 21:53

## 2017-03-02 RX ADMIN — Medication 3 MILLILITER(S): at 15:34

## 2017-03-02 RX ADMIN — Medication 100 MILLIGRAM(S): at 05:35

## 2017-03-02 RX ADMIN — Medication 3 MILLILITER(S): at 02:53

## 2017-03-02 RX ADMIN — Medication 6: at 08:39

## 2017-03-02 RX ADMIN — Medication 25 MILLIGRAM(S): at 05:34

## 2017-03-02 RX ADMIN — SODIUM CHLORIDE 50 MILLILITER(S): 9 INJECTION, SOLUTION INTRAVENOUS at 21:49

## 2017-03-02 RX ADMIN — Medication 1 DROP(S): at 18:12

## 2017-03-02 RX ADMIN — PIPERACILLIN AND TAZOBACTAM 25 GRAM(S): 4; .5 INJECTION, POWDER, LYOPHILIZED, FOR SOLUTION INTRAVENOUS at 05:35

## 2017-03-02 RX ADMIN — Medication 3 MILLILITER(S): at 15:35

## 2017-03-02 RX ADMIN — Medication 1 DROP(S): at 05:35

## 2017-03-02 RX ADMIN — PIPERACILLIN AND TAZOBACTAM 25 GRAM(S): 4; .5 INJECTION, POWDER, LYOPHILIZED, FOR SOLUTION INTRAVENOUS at 21:50

## 2017-03-02 RX ADMIN — MODAFINIL 100 MILLIGRAM(S): 200 TABLET ORAL at 13:08

## 2017-03-02 RX ADMIN — SODIUM CHLORIDE 50 MILLILITER(S): 9 INJECTION, SOLUTION INTRAVENOUS at 11:33

## 2017-03-02 RX ADMIN — Medication 1 APPLICATION(S): at 13:09

## 2017-03-02 RX ADMIN — AMLODIPINE BESYLATE 10 MILLIGRAM(S): 2.5 TABLET ORAL at 05:35

## 2017-03-02 RX ADMIN — INSULIN GLARGINE 5 UNIT(S): 100 INJECTION, SOLUTION SUBCUTANEOUS at 21:53

## 2017-03-02 RX ADMIN — Medication 10: at 18:12

## 2017-03-02 RX ADMIN — Medication 3 MILLILITER(S): at 21:24

## 2017-03-03 LAB
ANION GAP SERPL CALC-SCNC: 10 MMOL/L — SIGNIFICANT CHANGE UP (ref 5–17)
BUN SERPL-MCNC: 45 MG/DL — HIGH (ref 7–18)
CALCIUM SERPL-MCNC: 8.7 MG/DL — SIGNIFICANT CHANGE UP (ref 8.4–10.5)
CHLORIDE SERPL-SCNC: 107 MMOL/L — SIGNIFICANT CHANGE UP (ref 96–108)
CO2 SERPL-SCNC: 32 MMOL/L — HIGH (ref 22–31)
CREAT SERPL-MCNC: 1.26 MG/DL — SIGNIFICANT CHANGE UP (ref 0.5–1.3)
GLUCOSE SERPL-MCNC: 284 MG/DL — HIGH (ref 70–99)
HCT VFR BLD CALC: 33 % — LOW (ref 34.5–45)
HGB BLD-MCNC: 10.4 G/DL — LOW (ref 11.5–15.5)
MAGNESIUM SERPL-MCNC: 2.5 MG/DL — HIGH (ref 1.8–2.4)
MCHC RBC-ENTMCNC: 27 PG — SIGNIFICANT CHANGE UP (ref 27–34)
MCHC RBC-ENTMCNC: 31.5 GM/DL — LOW (ref 32–36)
MCV RBC AUTO: 85.8 FL — SIGNIFICANT CHANGE UP (ref 80–100)
PLATELET # BLD AUTO: 157 K/UL — SIGNIFICANT CHANGE UP (ref 150–400)
POTASSIUM SERPL-MCNC: 3.6 MMOL/L — SIGNIFICANT CHANGE UP (ref 3.5–5.3)
POTASSIUM SERPL-SCNC: 3.6 MMOL/L — SIGNIFICANT CHANGE UP (ref 3.5–5.3)
RBC # BLD: 3.85 M/UL — SIGNIFICANT CHANGE UP (ref 3.8–5.2)
RBC # FLD: 14.5 % — SIGNIFICANT CHANGE UP (ref 10.3–14.5)
SODIUM SERPL-SCNC: 149 MMOL/L — HIGH (ref 135–145)
T4 AB SER-ACNC: 5.9 UG/DL — SIGNIFICANT CHANGE UP (ref 4.6–12)
WBC # BLD: 17.7 K/UL — HIGH (ref 3.8–10.5)
WBC # FLD AUTO: 17.7 K/UL — HIGH (ref 3.8–10.5)

## 2017-03-03 PROCEDURE — 99233 SBSQ HOSP IP/OBS HIGH 50: CPT

## 2017-03-03 RX ORDER — SODIUM CHLORIDE 9 MG/ML
1000 INJECTION, SOLUTION INTRAVENOUS
Qty: 0 | Refills: 0 | Status: DISCONTINUED | OUTPATIENT
Start: 2017-03-03 | End: 2017-03-05

## 2017-03-03 RX ORDER — MORPHINE SULFATE 50 MG/1
5 CAPSULE, EXTENDED RELEASE ORAL
Qty: 0 | Refills: 0 | Status: DISCONTINUED | OUTPATIENT
Start: 2017-03-03 | End: 2017-03-07

## 2017-03-03 RX ADMIN — Medication 3 MILLILITER(S): at 03:32

## 2017-03-03 RX ADMIN — Medication 8: at 08:34

## 2017-03-03 RX ADMIN — SODIUM CHLORIDE 60 MILLILITER(S): 9 INJECTION, SOLUTION INTRAVENOUS at 18:32

## 2017-03-03 RX ADMIN — Medication 10: at 12:20

## 2017-03-03 RX ADMIN — AMLODIPINE BESYLATE 10 MILLIGRAM(S): 2.5 TABLET ORAL at 05:34

## 2017-03-03 RX ADMIN — SODIUM CHLORIDE 50 MILLILITER(S): 9 INJECTION, SOLUTION INTRAVENOUS at 05:31

## 2017-03-03 RX ADMIN — Medication 25 MILLIGRAM(S): at 05:33

## 2017-03-03 RX ADMIN — Medication 1 APPLICATION(S): at 05:34

## 2017-03-03 RX ADMIN — Medication 1 TABLET(S): at 05:34

## 2017-03-03 RX ADMIN — Medication 3 MILLILITER(S): at 08:56

## 2017-03-03 RX ADMIN — Medication 1 APPLICATION(S): at 13:11

## 2017-03-03 RX ADMIN — SODIUM CHLORIDE 60 MILLILITER(S): 9 INJECTION, SOLUTION INTRAVENOUS at 23:00

## 2017-03-03 RX ADMIN — Medication 81 MILLIGRAM(S): at 12:33

## 2017-03-03 RX ADMIN — Medication 25 MILLIGRAM(S): at 18:29

## 2017-03-03 RX ADMIN — Medication 3 MILLILITER(S): at 05:32

## 2017-03-03 RX ADMIN — ENOXAPARIN SODIUM 40 MILLIGRAM(S): 100 INJECTION SUBCUTANEOUS at 12:20

## 2017-03-03 RX ADMIN — Medication 3 MILLILITER(S): at 08:55

## 2017-03-03 RX ADMIN — PIPERACILLIN AND TAZOBACTAM 25 GRAM(S): 4; .5 INJECTION, POWDER, LYOPHILIZED, FOR SOLUTION INTRAVENOUS at 13:11

## 2017-03-03 RX ADMIN — Medication 1 DROP(S): at 18:28

## 2017-03-03 RX ADMIN — Medication 1 DROP(S): at 05:35

## 2017-03-03 RX ADMIN — MODAFINIL 200 MILLIGRAM(S): 200 TABLET ORAL at 12:46

## 2017-03-03 RX ADMIN — PIPERACILLIN AND TAZOBACTAM 25 GRAM(S): 4; .5 INJECTION, POWDER, LYOPHILIZED, FOR SOLUTION INTRAVENOUS at 05:31

## 2017-03-03 RX ADMIN — Medication 30 MILLIGRAM(S): at 05:34

## 2017-03-03 RX ADMIN — Medication 1 APPLICATION(S): at 23:02

## 2017-03-04 LAB
ANION GAP SERPL CALC-SCNC: 9 MMOL/L — SIGNIFICANT CHANGE UP (ref 5–17)
BASOPHILS # BLD AUTO: 0 K/UL — SIGNIFICANT CHANGE UP (ref 0–0.2)
BASOPHILS NFR BLD AUTO: 0.3 % — SIGNIFICANT CHANGE UP (ref 0–2)
BUN SERPL-MCNC: 33 MG/DL — HIGH (ref 7–18)
CALCIUM SERPL-MCNC: 8.2 MG/DL — LOW (ref 8.4–10.5)
CHLORIDE SERPL-SCNC: 104 MMOL/L — SIGNIFICANT CHANGE UP (ref 96–108)
CO2 SERPL-SCNC: 30 MMOL/L — SIGNIFICANT CHANGE UP (ref 22–31)
CREAT SERPL-MCNC: 1.05 MG/DL — SIGNIFICANT CHANGE UP (ref 0.5–1.3)
EOSINOPHIL # BLD AUTO: 0.1 K/UL — SIGNIFICANT CHANGE UP (ref 0–0.5)
EOSINOPHIL NFR BLD AUTO: 0.9 % — SIGNIFICANT CHANGE UP (ref 0–6)
GLUCOSE SERPL-MCNC: 334 MG/DL — HIGH (ref 70–99)
HCT VFR BLD CALC: 32.4 % — LOW (ref 34.5–45)
HGB BLD-MCNC: 10.3 G/DL — LOW (ref 11.5–15.5)
LYMPHOCYTES # BLD AUTO: 1.2 K/UL — SIGNIFICANT CHANGE UP (ref 1–3.3)
LYMPHOCYTES # BLD AUTO: 9.4 % — LOW (ref 13–44)
MAGNESIUM SERPL-MCNC: 2.3 MG/DL — SIGNIFICANT CHANGE UP (ref 1.8–2.4)
MCHC RBC-ENTMCNC: 27.5 PG — SIGNIFICANT CHANGE UP (ref 27–34)
MCHC RBC-ENTMCNC: 31.9 GM/DL — LOW (ref 32–36)
MCV RBC AUTO: 86.2 FL — SIGNIFICANT CHANGE UP (ref 80–100)
MONOCYTES # BLD AUTO: 0.6 K/UL — SIGNIFICANT CHANGE UP (ref 0–0.9)
MONOCYTES NFR BLD AUTO: 4.4 % — SIGNIFICANT CHANGE UP (ref 2–14)
NEUTROPHILS # BLD AUTO: 11 K/UL — HIGH (ref 1.8–7.4)
NEUTROPHILS NFR BLD AUTO: 85.1 % — HIGH (ref 43–77)
PHOSPHATE SERPL-MCNC: 2.5 MG/DL — SIGNIFICANT CHANGE UP (ref 2.5–4.5)
PLATELET # BLD AUTO: 171 K/UL — SIGNIFICANT CHANGE UP (ref 150–400)
POTASSIUM SERPL-MCNC: 3.6 MMOL/L — SIGNIFICANT CHANGE UP (ref 3.5–5.3)
POTASSIUM SERPL-SCNC: 3.6 MMOL/L — SIGNIFICANT CHANGE UP (ref 3.5–5.3)
RBC # BLD: 3.76 M/UL — LOW (ref 3.8–5.2)
RBC # FLD: 14.6 % — HIGH (ref 10.3–14.5)
SODIUM SERPL-SCNC: 143 MMOL/L — SIGNIFICANT CHANGE UP (ref 135–145)
T3 SERPL-MCNC: 51 NG/DL — LOW (ref 80–200)
WBC # BLD: 12.9 K/UL — HIGH (ref 3.8–10.5)
WBC # FLD AUTO: 12.9 K/UL — HIGH (ref 3.8–10.5)

## 2017-03-04 RX ORDER — PIPERACILLIN AND TAZOBACTAM 4; .5 G/20ML; G/20ML
3.38 INJECTION, POWDER, LYOPHILIZED, FOR SOLUTION INTRAVENOUS EVERY 8 HOURS
Qty: 0 | Refills: 0 | Status: DISCONTINUED | OUTPATIENT
Start: 2017-03-04 | End: 2017-03-06

## 2017-03-04 RX ORDER — INSULIN LISPRO 100/ML
VIAL (ML) SUBCUTANEOUS
Qty: 0 | Refills: 0 | Status: DISCONTINUED | OUTPATIENT
Start: 2017-03-04 | End: 2017-03-04

## 2017-03-04 RX ORDER — MODAFINIL 200 MG/1
200 TABLET ORAL DAILY
Qty: 0 | Refills: 0 | Status: DISCONTINUED | OUTPATIENT
Start: 2017-03-04 | End: 2017-03-06

## 2017-03-04 RX ORDER — INSULIN LISPRO 100/ML
3 VIAL (ML) SUBCUTANEOUS EVERY 6 HOURS
Qty: 0 | Refills: 0 | Status: DISCONTINUED | OUTPATIENT
Start: 2017-03-04 | End: 2017-03-06

## 2017-03-04 RX ORDER — INSULIN LISPRO 100/ML
VIAL (ML) SUBCUTANEOUS EVERY 6 HOURS
Qty: 0 | Refills: 0 | Status: DISCONTINUED | OUTPATIENT
Start: 2017-03-04 | End: 2017-03-07

## 2017-03-04 RX ADMIN — Medication 3: at 23:54

## 2017-03-04 RX ADMIN — Medication 3 UNIT(S): at 12:15

## 2017-03-04 RX ADMIN — Medication 4: at 12:16

## 2017-03-04 RX ADMIN — SODIUM CHLORIDE 60 MILLILITER(S): 9 INJECTION, SOLUTION INTRAVENOUS at 12:16

## 2017-03-04 RX ADMIN — Medication 25 MILLIGRAM(S): at 17:17

## 2017-03-04 RX ADMIN — Medication 1 DROP(S): at 05:49

## 2017-03-04 RX ADMIN — Medication 1 APPLICATION(S): at 05:49

## 2017-03-04 RX ADMIN — PIPERACILLIN AND TAZOBACTAM 25 GRAM(S): 4; .5 INJECTION, POWDER, LYOPHILIZED, FOR SOLUTION INTRAVENOUS at 21:56

## 2017-03-04 RX ADMIN — Medication 1 APPLICATION(S): at 14:16

## 2017-03-04 RX ADMIN — PIPERACILLIN AND TAZOBACTAM 25 GRAM(S): 4; .5 INJECTION, POWDER, LYOPHILIZED, FOR SOLUTION INTRAVENOUS at 16:07

## 2017-03-04 RX ADMIN — Medication 1 APPLICATION(S): at 21:55

## 2017-03-04 RX ADMIN — Medication 25 MILLIGRAM(S): at 05:49

## 2017-03-04 RX ADMIN — Medication 1 DROP(S): at 17:17

## 2017-03-04 RX ADMIN — Medication 3 UNIT(S): at 17:16

## 2017-03-04 RX ADMIN — ENOXAPARIN SODIUM 40 MILLIGRAM(S): 100 INJECTION SUBCUTANEOUS at 12:16

## 2017-03-04 RX ADMIN — SODIUM CHLORIDE 60 MILLILITER(S): 9 INJECTION, SOLUTION INTRAVENOUS at 05:48

## 2017-03-04 RX ADMIN — AMLODIPINE BESYLATE 10 MILLIGRAM(S): 2.5 TABLET ORAL at 05:49

## 2017-03-04 RX ADMIN — Medication 4: at 17:16

## 2017-03-04 RX ADMIN — MODAFINIL 200 MILLIGRAM(S): 200 TABLET ORAL at 16:07

## 2017-03-05 RX ADMIN — MODAFINIL 200 MILLIGRAM(S): 200 TABLET ORAL at 12:16

## 2017-03-05 RX ADMIN — Medication 1 APPLICATION(S): at 13:57

## 2017-03-05 RX ADMIN — Medication 1 DROP(S): at 17:17

## 2017-03-05 RX ADMIN — Medication 25 MILLIGRAM(S): at 17:17

## 2017-03-05 RX ADMIN — Medication 1 APPLICATION(S): at 05:02

## 2017-03-05 RX ADMIN — Medication 1: at 17:16

## 2017-03-05 RX ADMIN — Medication 1: at 05:15

## 2017-03-05 RX ADMIN — Medication 1 APPLICATION(S): at 21:32

## 2017-03-05 RX ADMIN — Medication 3 UNIT(S): at 17:16

## 2017-03-05 RX ADMIN — Medication 3 UNIT(S): at 12:16

## 2017-03-05 RX ADMIN — PIPERACILLIN AND TAZOBACTAM 25 GRAM(S): 4; .5 INJECTION, POWDER, LYOPHILIZED, FOR SOLUTION INTRAVENOUS at 21:32

## 2017-03-05 RX ADMIN — AMLODIPINE BESYLATE 10 MILLIGRAM(S): 2.5 TABLET ORAL at 05:01

## 2017-03-05 RX ADMIN — PIPERACILLIN AND TAZOBACTAM 25 GRAM(S): 4; .5 INJECTION, POWDER, LYOPHILIZED, FOR SOLUTION INTRAVENOUS at 13:57

## 2017-03-05 RX ADMIN — SODIUM CHLORIDE 60 MILLILITER(S): 9 INJECTION, SOLUTION INTRAVENOUS at 05:03

## 2017-03-05 RX ADMIN — Medication 3 UNIT(S): at 05:17

## 2017-03-05 RX ADMIN — Medication 1 DROP(S): at 05:02

## 2017-03-05 RX ADMIN — PIPERACILLIN AND TAZOBACTAM 25 GRAM(S): 4; .5 INJECTION, POWDER, LYOPHILIZED, FOR SOLUTION INTRAVENOUS at 05:01

## 2017-03-05 RX ADMIN — Medication 2: at 12:16

## 2017-03-05 RX ADMIN — ENOXAPARIN SODIUM 40 MILLIGRAM(S): 100 INJECTION SUBCUTANEOUS at 12:16

## 2017-03-05 RX ADMIN — Medication 25 MILLIGRAM(S): at 05:01

## 2017-03-06 LAB
ANION GAP SERPL CALC-SCNC: 8 MMOL/L — SIGNIFICANT CHANGE UP (ref 5–17)
BASOPHILS # BLD AUTO: 0 K/UL — SIGNIFICANT CHANGE UP (ref 0–0.2)
BASOPHILS NFR BLD AUTO: 0.3 % — SIGNIFICANT CHANGE UP (ref 0–2)
BUN SERPL-MCNC: 19 MG/DL — HIGH (ref 7–18)
CALCIUM SERPL-MCNC: 8.2 MG/DL — LOW (ref 8.4–10.5)
CHLORIDE SERPL-SCNC: 103 MMOL/L — SIGNIFICANT CHANGE UP (ref 96–108)
CO2 SERPL-SCNC: 31 MMOL/L — SIGNIFICANT CHANGE UP (ref 22–31)
CREAT SERPL-MCNC: 0.9 MG/DL — SIGNIFICANT CHANGE UP (ref 0.5–1.3)
EOSINOPHIL # BLD AUTO: 0.2 K/UL — SIGNIFICANT CHANGE UP (ref 0–0.5)
EOSINOPHIL NFR BLD AUTO: 1.3 % — SIGNIFICANT CHANGE UP (ref 0–6)
GLUCOSE SERPL-MCNC: 174 MG/DL — HIGH (ref 70–99)
HCT VFR BLD CALC: 30.6 % — LOW (ref 34.5–45)
HGB BLD-MCNC: 9.9 G/DL — LOW (ref 11.5–15.5)
LYMPHOCYTES # BLD AUTO: 1.7 K/UL — SIGNIFICANT CHANGE UP (ref 1–3.3)
LYMPHOCYTES # BLD AUTO: 13.2 % — SIGNIFICANT CHANGE UP (ref 13–44)
MAGNESIUM SERPL-MCNC: 2.3 MG/DL — SIGNIFICANT CHANGE UP (ref 1.8–2.4)
MCHC RBC-ENTMCNC: 27.8 PG — SIGNIFICANT CHANGE UP (ref 27–34)
MCHC RBC-ENTMCNC: 32.4 GM/DL — SIGNIFICANT CHANGE UP (ref 32–36)
MCV RBC AUTO: 85.7 FL — SIGNIFICANT CHANGE UP (ref 80–100)
MONOCYTES # BLD AUTO: 0.4 K/UL — SIGNIFICANT CHANGE UP (ref 0–0.9)
MONOCYTES NFR BLD AUTO: 3.4 % — SIGNIFICANT CHANGE UP (ref 2–14)
NEUTROPHILS # BLD AUTO: 10.8 K/UL — HIGH (ref 1.8–7.4)
NEUTROPHILS NFR BLD AUTO: 81.8 % — HIGH (ref 43–77)
PHOSPHATE SERPL-MCNC: 2.5 MG/DL — SIGNIFICANT CHANGE UP (ref 2.5–4.5)
PLATELET # BLD AUTO: 177 K/UL — SIGNIFICANT CHANGE UP (ref 150–400)
POTASSIUM SERPL-MCNC: 3.3 MMOL/L — LOW (ref 3.5–5.3)
POTASSIUM SERPL-SCNC: 3.3 MMOL/L — LOW (ref 3.5–5.3)
RBC # BLD: 3.58 M/UL — LOW (ref 3.8–5.2)
RBC # FLD: 15.8 % — HIGH (ref 10.3–14.5)
SODIUM SERPL-SCNC: 142 MMOL/L — SIGNIFICANT CHANGE UP (ref 135–145)
WBC # BLD: 13.2 K/UL — HIGH (ref 3.8–10.5)
WBC # FLD AUTO: 13.2 K/UL — HIGH (ref 3.8–10.5)

## 2017-03-06 PROCEDURE — 99233 SBSQ HOSP IP/OBS HIGH 50: CPT

## 2017-03-06 RX ORDER — MODAFINIL 200 MG/1
1 TABLET ORAL
Qty: 0 | Refills: 0 | COMMUNITY
Start: 2017-03-06

## 2017-03-06 RX ORDER — MODAFINIL 200 MG/1
200 TABLET ORAL ONCE
Qty: 0 | Refills: 0 | Status: DISCONTINUED | OUTPATIENT
Start: 2017-03-06 | End: 2017-03-06

## 2017-03-06 RX ORDER — MODAFINIL 200 MG/1
200 TABLET ORAL
Qty: 0 | Refills: 0 | Status: DISCONTINUED | OUTPATIENT
Start: 2017-03-06 | End: 2017-03-07

## 2017-03-06 RX ORDER — SODIUM CHLORIDE 9 MG/ML
1000 INJECTION, SOLUTION INTRAVENOUS
Qty: 0 | Refills: 0 | Status: DISCONTINUED | OUTPATIENT
Start: 2017-03-06 | End: 2017-03-07

## 2017-03-06 RX ORDER — HYDROCORTISONE 1 %
1 OINTMENT (GRAM) TOPICAL
Qty: 0 | Refills: 0 | COMMUNITY
Start: 2017-03-06

## 2017-03-06 RX ORDER — MORPHINE SULFATE 50 MG/1
0.25 CAPSULE, EXTENDED RELEASE ORAL
Qty: 0 | Refills: 0 | COMMUNITY
Start: 2017-03-06

## 2017-03-06 RX ORDER — POTASSIUM CHLORIDE 20 MEQ
20 PACKET (EA) ORAL
Qty: 0 | Refills: 0 | Status: COMPLETED | OUTPATIENT
Start: 2017-03-06 | End: 2017-03-06

## 2017-03-06 RX ORDER — SODIUM CHLORIDE 9 MG/ML
1000 INJECTION, SOLUTION INTRAVENOUS
Qty: 0 | Refills: 0 | COMMUNITY
Start: 2017-03-06

## 2017-03-06 RX ADMIN — Medication 1: at 17:43

## 2017-03-06 RX ADMIN — Medication 25 MILLIGRAM(S): at 05:29

## 2017-03-06 RX ADMIN — SODIUM CHLORIDE 50 MILLILITER(S): 9 INJECTION, SOLUTION INTRAVENOUS at 23:34

## 2017-03-06 RX ADMIN — AMLODIPINE BESYLATE 10 MILLIGRAM(S): 2.5 TABLET ORAL at 05:29

## 2017-03-06 RX ADMIN — Medication 1 DROP(S): at 05:30

## 2017-03-06 RX ADMIN — ENOXAPARIN SODIUM 40 MILLIGRAM(S): 100 INJECTION SUBCUTANEOUS at 11:24

## 2017-03-06 RX ADMIN — Medication 3 UNIT(S): at 06:45

## 2017-03-06 RX ADMIN — Medication 1 APPLICATION(S): at 13:18

## 2017-03-06 RX ADMIN — Medication 2: at 11:26

## 2017-03-06 RX ADMIN — Medication 1 APPLICATION(S): at 05:31

## 2017-03-06 RX ADMIN — SODIUM CHLORIDE 50 MILLILITER(S): 9 INJECTION, SOLUTION INTRAVENOUS at 17:42

## 2017-03-06 RX ADMIN — Medication 1: at 00:32

## 2017-03-06 RX ADMIN — Medication 1: at 06:45

## 2017-03-06 RX ADMIN — Medication 1 DROP(S): at 17:41

## 2017-03-06 RX ADMIN — PIPERACILLIN AND TAZOBACTAM 25 GRAM(S): 4; .5 INJECTION, POWDER, LYOPHILIZED, FOR SOLUTION INTRAVENOUS at 13:18

## 2017-03-06 RX ADMIN — Medication 3 UNIT(S): at 11:25

## 2017-03-06 RX ADMIN — Medication 1 APPLICATION(S): at 23:35

## 2017-03-06 RX ADMIN — Medication 20 MILLIEQUIVALENT(S): at 10:45

## 2017-03-06 RX ADMIN — Medication 20 MILLIEQUIVALENT(S): at 13:18

## 2017-03-06 RX ADMIN — Medication 1: at 23:38

## 2017-03-06 RX ADMIN — MODAFINIL 200 MILLIGRAM(S): 200 TABLET ORAL at 11:24

## 2017-03-06 RX ADMIN — PIPERACILLIN AND TAZOBACTAM 25 GRAM(S): 4; .5 INJECTION, POWDER, LYOPHILIZED, FOR SOLUTION INTRAVENOUS at 05:29

## 2017-03-07 VITALS
RESPIRATION RATE: 15 BRPM | HEART RATE: 91 BPM | TEMPERATURE: 98 F | SYSTOLIC BLOOD PRESSURE: 145 MMHG | OXYGEN SATURATION: 98 % | DIASTOLIC BLOOD PRESSURE: 78 MMHG

## 2017-03-07 LAB
ANION GAP SERPL CALC-SCNC: 7 MMOL/L — SIGNIFICANT CHANGE UP (ref 5–17)
APTT BLD: 28.5 SEC — SIGNIFICANT CHANGE UP (ref 27.5–37.4)
BUN SERPL-MCNC: 17 MG/DL — SIGNIFICANT CHANGE UP (ref 7–18)
CALCIUM SERPL-MCNC: 8.4 MG/DL — SIGNIFICANT CHANGE UP (ref 8.4–10.5)
CHLORIDE SERPL-SCNC: 106 MMOL/L — SIGNIFICANT CHANGE UP (ref 96–108)
CO2 SERPL-SCNC: 28 MMOL/L — SIGNIFICANT CHANGE UP (ref 22–31)
CREAT SERPL-MCNC: 0.8 MG/DL — SIGNIFICANT CHANGE UP (ref 0.5–1.3)
GLUCOSE SERPL-MCNC: 154 MG/DL — HIGH (ref 70–99)
HCT VFR BLD CALC: 32.2 % — LOW (ref 34.5–45)
HGB BLD-MCNC: 10.2 G/DL — LOW (ref 11.5–15.5)
INR BLD: 0.89 RATIO — SIGNIFICANT CHANGE UP (ref 0.88–1.16)
MCHC RBC-ENTMCNC: 26.9 PG — LOW (ref 27–34)
MCHC RBC-ENTMCNC: 31.7 GM/DL — LOW (ref 32–36)
MCV RBC AUTO: 84.9 FL — SIGNIFICANT CHANGE UP (ref 80–100)
PLATELET # BLD AUTO: 180 K/UL — SIGNIFICANT CHANGE UP (ref 150–400)
POTASSIUM SERPL-MCNC: 3.6 MMOL/L — SIGNIFICANT CHANGE UP (ref 3.5–5.3)
POTASSIUM SERPL-SCNC: 3.6 MMOL/L — SIGNIFICANT CHANGE UP (ref 3.5–5.3)
PROTHROM AB SERPL-ACNC: 10 SEC — SIGNIFICANT CHANGE UP (ref 10–13.1)
RBC # BLD: 3.8 M/UL — SIGNIFICANT CHANGE UP (ref 3.8–5.2)
RBC # FLD: 15.8 % — HIGH (ref 10.3–14.5)
SODIUM SERPL-SCNC: 141 MMOL/L — SIGNIFICANT CHANGE UP (ref 135–145)
WBC # BLD: 9.8 K/UL — SIGNIFICANT CHANGE UP (ref 3.8–10.5)
WBC # FLD AUTO: 9.8 K/UL — SIGNIFICANT CHANGE UP (ref 3.8–10.5)

## 2017-03-07 PROCEDURE — 85379 FIBRIN DEGRADATION QUANT: CPT

## 2017-03-07 PROCEDURE — 87633 RESP VIRUS 12-25 TARGETS: CPT

## 2017-03-07 PROCEDURE — 85610 PROTHROMBIN TIME: CPT

## 2017-03-07 PROCEDURE — 83036 HEMOGLOBIN GLYCOSYLATED A1C: CPT

## 2017-03-07 PROCEDURE — 85652 RBC SED RATE AUTOMATED: CPT

## 2017-03-07 PROCEDURE — 84439 ASSAY OF FREE THYROXINE: CPT

## 2017-03-07 PROCEDURE — 82728 ASSAY OF FERRITIN: CPT

## 2017-03-07 PROCEDURE — 84466 ASSAY OF TRANSFERRIN: CPT

## 2017-03-07 PROCEDURE — 93005 ELECTROCARDIOGRAM TRACING: CPT

## 2017-03-07 PROCEDURE — 84156 ASSAY OF PROTEIN URINE: CPT

## 2017-03-07 PROCEDURE — 93970 EXTREMITY STUDY: CPT

## 2017-03-07 PROCEDURE — 84443 ASSAY THYROID STIM HORMONE: CPT

## 2017-03-07 PROCEDURE — 71275 CT ANGIOGRAPHY CHEST: CPT

## 2017-03-07 PROCEDURE — 82570 ASSAY OF URINE CREATININE: CPT

## 2017-03-07 PROCEDURE — 86901 BLOOD TYPING SEROLOGIC RH(D): CPT

## 2017-03-07 PROCEDURE — 85027 COMPLETE CBC AUTOMATED: CPT

## 2017-03-07 PROCEDURE — 87798 DETECT AGENT NOS DNA AMP: CPT

## 2017-03-07 PROCEDURE — 99285 EMERGENCY DEPT VISIT HI MDM: CPT | Mod: 25

## 2017-03-07 PROCEDURE — 83935 ASSAY OF URINE OSMOLALITY: CPT

## 2017-03-07 PROCEDURE — 82272 OCCULT BLD FECES 1-3 TESTS: CPT

## 2017-03-07 PROCEDURE — 84145 PROCALCITONIN (PCT): CPT

## 2017-03-07 PROCEDURE — 86140 C-REACTIVE PROTEIN: CPT

## 2017-03-07 PROCEDURE — 84480 ASSAY TRIIODOTHYRONINE (T3): CPT

## 2017-03-07 PROCEDURE — 83605 ASSAY OF LACTIC ACID: CPT

## 2017-03-07 PROCEDURE — 94660 CPAP INITIATION&MGMT: CPT

## 2017-03-07 PROCEDURE — 84481 FREE ASSAY (FT-3): CPT

## 2017-03-07 PROCEDURE — 82550 ASSAY OF CK (CPK): CPT

## 2017-03-07 PROCEDURE — 87040 BLOOD CULTURE FOR BACTERIA: CPT

## 2017-03-07 PROCEDURE — 83735 ASSAY OF MAGNESIUM: CPT

## 2017-03-07 PROCEDURE — 96374 THER/PROPH/DIAG INJ IV PUSH: CPT

## 2017-03-07 PROCEDURE — 83550 IRON BINDING TEST: CPT

## 2017-03-07 PROCEDURE — 80048 BASIC METABOLIC PNL TOTAL CA: CPT

## 2017-03-07 PROCEDURE — 94640 AIRWAY INHALATION TREATMENT: CPT

## 2017-03-07 PROCEDURE — 84100 ASSAY OF PHOSPHORUS: CPT

## 2017-03-07 PROCEDURE — 87581 M.PNEUMON DNA AMP PROBE: CPT

## 2017-03-07 PROCEDURE — 87486 CHLMYD PNEUM DNA AMP PROBE: CPT

## 2017-03-07 PROCEDURE — 84300 ASSAY OF URINE SODIUM: CPT

## 2017-03-07 PROCEDURE — 85730 THROMBOPLASTIN TIME PARTIAL: CPT

## 2017-03-07 PROCEDURE — 90686 IIV4 VACC NO PRSV 0.5 ML IM: CPT

## 2017-03-07 PROCEDURE — 80053 COMPREHEN METABOLIC PANEL: CPT

## 2017-03-07 PROCEDURE — 86850 RBC ANTIBODY SCREEN: CPT

## 2017-03-07 PROCEDURE — 92610 EVALUATE SWALLOWING FUNCTION: CPT

## 2017-03-07 PROCEDURE — 82803 BLOOD GASES ANY COMBINATION: CPT

## 2017-03-07 PROCEDURE — 83880 ASSAY OF NATRIURETIC PEPTIDE: CPT

## 2017-03-07 PROCEDURE — 84484 ASSAY OF TROPONIN QUANT: CPT

## 2017-03-07 PROCEDURE — 71045 X-RAY EXAM CHEST 1 VIEW: CPT

## 2017-03-07 PROCEDURE — 81001 URINALYSIS AUTO W/SCOPE: CPT

## 2017-03-07 PROCEDURE — 84436 ASSAY OF TOTAL THYROXINE: CPT

## 2017-03-07 PROCEDURE — 80061 LIPID PANEL: CPT

## 2017-03-07 RX ORDER — INFLUENZA VIRUS VACCINE 15; 15; 15; 15 UG/.5ML; UG/.5ML; UG/.5ML; UG/.5ML
0.5 SUSPENSION INTRAMUSCULAR ONCE
Qty: 0 | Refills: 0 | Status: COMPLETED | OUTPATIENT
Start: 2017-03-07 | End: 2017-03-07

## 2017-03-07 RX ORDER — MODAFINIL 200 MG/1
1 TABLET ORAL
Qty: 0 | Refills: 0 | COMMUNITY
Start: 2017-03-07

## 2017-03-07 RX ADMIN — Medication 3: at 11:26

## 2017-03-07 RX ADMIN — Medication 1 APPLICATION(S): at 05:54

## 2017-03-07 RX ADMIN — SODIUM CHLORIDE 50 MILLILITER(S): 9 INJECTION, SOLUTION INTRAVENOUS at 05:54

## 2017-03-07 RX ADMIN — ENOXAPARIN SODIUM 40 MILLIGRAM(S): 100 INJECTION SUBCUTANEOUS at 11:26

## 2017-03-07 RX ADMIN — Medication 1 APPLICATION(S): at 13:23

## 2017-03-07 RX ADMIN — INFLUENZA VIRUS VACCINE 0.5 MILLILITER(S): 15; 15; 15; 15 SUSPENSION INTRAMUSCULAR at 15:28

## 2017-03-07 RX ADMIN — Medication 1 DROP(S): at 05:55

## 2017-03-07 RX ADMIN — MODAFINIL 200 MILLIGRAM(S): 200 TABLET ORAL at 08:42

## 2017-03-10 DIAGNOSIS — G93.40 ENCEPHALOPATHY, UNSPECIFIED: ICD-10-CM

## 2017-03-10 DIAGNOSIS — R62.7 ADULT FAILURE TO THRIVE: ICD-10-CM

## 2017-03-10 DIAGNOSIS — I25.9 CHRONIC ISCHEMIC HEART DISEASE, UNSPECIFIED: ICD-10-CM

## 2017-03-10 DIAGNOSIS — Z51.5 ENCOUNTER FOR PALLIATIVE CARE: ICD-10-CM

## 2017-03-10 DIAGNOSIS — F02.81 DEMENTIA IN OTHER DISEASES CLASSIFIED ELSEWHERE, UNSPECIFIED SEVERITY, WITH BEHAVIORAL DISTURBANCE: ICD-10-CM

## 2017-03-10 DIAGNOSIS — Z74.01 BED CONFINEMENT STATUS: ICD-10-CM

## 2017-03-10 DIAGNOSIS — Z23 ENCOUNTER FOR IMMUNIZATION: ICD-10-CM

## 2017-03-10 DIAGNOSIS — Z79.51 LONG TERM (CURRENT) USE OF INHALED STEROIDS: ICD-10-CM

## 2017-03-10 DIAGNOSIS — E87.3 ALKALOSIS: ICD-10-CM

## 2017-03-10 DIAGNOSIS — G30.9 ALZHEIMER'S DISEASE, UNSPECIFIED: ICD-10-CM

## 2017-03-10 DIAGNOSIS — Z66 DO NOT RESUSCITATE: ICD-10-CM

## 2017-03-10 DIAGNOSIS — T38.0X5A ADVERSE EFFECT OF GLUCOCORTICOIDS AND SYNTHETIC ANALOGUES, INITIAL ENCOUNTER: ICD-10-CM

## 2017-03-10 DIAGNOSIS — N17.9 ACUTE KIDNEY FAILURE, UNSPECIFIED: ICD-10-CM

## 2017-03-10 DIAGNOSIS — J96.02 ACUTE RESPIRATORY FAILURE WITH HYPERCAPNIA: ICD-10-CM

## 2017-03-10 DIAGNOSIS — R13.10 DYSPHAGIA, UNSPECIFIED: ICD-10-CM

## 2017-03-10 DIAGNOSIS — E78.5 HYPERLIPIDEMIA, UNSPECIFIED: ICD-10-CM

## 2017-03-10 DIAGNOSIS — E87.0 HYPEROSMOLALITY AND HYPERNATREMIA: ICD-10-CM

## 2017-03-10 DIAGNOSIS — E11.65 TYPE 2 DIABETES MELLITUS WITH HYPERGLYCEMIA: ICD-10-CM

## 2017-03-10 DIAGNOSIS — K21.9 GASTRO-ESOPHAGEAL REFLUX DISEASE WITHOUT ESOPHAGITIS: ICD-10-CM

## 2017-03-10 DIAGNOSIS — I25.10 ATHEROSCLEROTIC HEART DISEASE OF NATIVE CORONARY ARTERY WITHOUT ANGINA PECTORIS: ICD-10-CM

## 2017-03-10 DIAGNOSIS — I50.30 UNSPECIFIED DIASTOLIC (CONGESTIVE) HEART FAILURE: ICD-10-CM

## 2017-03-10 DIAGNOSIS — J44.1 CHRONIC OBSTRUCTIVE PULMONARY DISEASE WITH (ACUTE) EXACERBATION: ICD-10-CM

## 2017-03-10 DIAGNOSIS — J69.0 PNEUMONITIS DUE TO INHALATION OF FOOD AND VOMIT: ICD-10-CM

## 2017-03-10 DIAGNOSIS — E43 UNSPECIFIED SEVERE PROTEIN-CALORIE MALNUTRITION: ICD-10-CM

## 2017-03-10 DIAGNOSIS — J96.01 ACUTE RESPIRATORY FAILURE WITH HYPOXIA: ICD-10-CM

## 2017-03-10 DIAGNOSIS — J98.11 ATELECTASIS: ICD-10-CM

## 2017-03-10 DIAGNOSIS — D72.829 ELEVATED WHITE BLOOD CELL COUNT, UNSPECIFIED: ICD-10-CM

## 2017-03-10 DIAGNOSIS — F41.9 ANXIETY DISORDER, UNSPECIFIED: ICD-10-CM

## 2017-03-13 DIAGNOSIS — E86.0 DEHYDRATION: ICD-10-CM

## 2017-03-13 DIAGNOSIS — I11.0 HYPERTENSIVE HEART DISEASE WITH HEART FAILURE: ICD-10-CM

## 2017-03-13 DIAGNOSIS — D63.8 ANEMIA IN OTHER CHRONIC DISEASES CLASSIFIED ELSEWHERE: ICD-10-CM

## 2018-05-01 NOTE — ED ADULT NURSE NOTE - NURSING NEURO LEVEL OF CONSCIOUSNESS
Mr Bishnu Flaherty is clear to participate in gym class activities so long as they do not cause her pain  Planks are fine long as her low back does not curve ( as it seems you've already been teaching them to avoid!)  If she is unable to perform without curving, can regress to plank on knees  Any questions please do not hesitate to call!     Thank you,      Milton Fitzgerald, PT, DPT, CSCS
alert and awake

## 2020-01-21 NOTE — SWALLOW BEDSIDE ASSESSMENT ADULT - POSITIONING
[facility]  [id number] [address]  [phone]        January 21, 2020    Regarding:  Mary Bueno  [id number]  [facility] [address]              To Whom It May Concern;  The above named patient continue having uncontrollable headaches. She is continuing to need homebound/homeschooling while she pursues more adequate treatment. She will likely need this for at least through 4/30/2020. She is continuing with specialist treatments.           Sincerely,        Anabel Silver MD   upright (45 degrees)

## 2025-07-25 NOTE — PATIENT PROFILE ADULT. - NS PRO CONTRA FLU 1
Patient's name noted on Care Transitions candidate list. Attempted to contact patient by phone on three separate occasions and left messages with each attempt. Patient did not return calls. Will close case at this time but would be happy to work with patient in the future if needs arise.   unable to assess immunization status